# Patient Record
Sex: MALE | Race: WHITE | HISPANIC OR LATINO
[De-identification: names, ages, dates, MRNs, and addresses within clinical notes are randomized per-mention and may not be internally consistent; named-entity substitution may affect disease eponyms.]

---

## 2017-10-16 ENCOUNTER — RESULT REVIEW (OUTPATIENT)
Age: 72
End: 2017-10-16

## 2018-02-18 ENCOUNTER — TRANSCRIPTION ENCOUNTER (OUTPATIENT)
Age: 73
End: 2018-02-18

## 2018-04-11 ENCOUNTER — APPOINTMENT (OUTPATIENT)
Dept: UROLOGY | Facility: CLINIC | Age: 73
End: 2018-04-11
Payer: COMMERCIAL

## 2018-04-11 VITALS
DIASTOLIC BLOOD PRESSURE: 80 MMHG | RESPIRATION RATE: 16 BRPM | HEART RATE: 88 BPM | OXYGEN SATURATION: 97 % | SYSTOLIC BLOOD PRESSURE: 110 MMHG | WEIGHT: 175 LBS | TEMPERATURE: 98.3 F | BODY MASS INDEX: 28.12 KG/M2 | HEIGHT: 66 IN

## 2018-04-11 DIAGNOSIS — Z86.79 PERSONAL HISTORY OF OTHER DISEASES OF THE CIRCULATORY SYSTEM: ICD-10-CM

## 2018-04-11 DIAGNOSIS — Z86.39 PERSONAL HISTORY OF OTHER ENDOCRINE, NUTRITIONAL AND METABOLIC DISEASE: ICD-10-CM

## 2018-04-11 DIAGNOSIS — C43.9 MALIGNANT MELANOMA OF SKIN, UNSPECIFIED: ICD-10-CM

## 2018-04-11 DIAGNOSIS — Z84.1 FAMILY HISTORY OF DISORDERS OF KIDNEY AND URETER: ICD-10-CM

## 2018-04-11 PROCEDURE — 99214 OFFICE O/P EST MOD 30 MIN: CPT

## 2018-04-11 RX ORDER — SITAGLIPTIN 100 MG/1
TABLET, FILM COATED ORAL
Refills: 0 | Status: ACTIVE | COMMUNITY

## 2018-04-11 RX ORDER — GLIPIZIDE 2.5 MG/1
TABLET ORAL
Refills: 0 | Status: ACTIVE | COMMUNITY

## 2018-04-11 RX ORDER — DOXEPIN HYDROCHLORIDE 75 MG/1
CAPSULE ORAL
Refills: 0 | Status: ACTIVE | COMMUNITY

## 2018-04-11 RX ORDER — METFORMIN HYDROCHLORIDE 500 MG/1
500 TABLET, COATED ORAL
Refills: 0 | Status: ACTIVE | COMMUNITY

## 2018-04-11 RX ORDER — AMOXICILLIN AND CLAVULANATE POTASSIUM 875; 125 MG/1; MG/1
875-125 TABLET, COATED ORAL
Qty: 6 | Refills: 0 | Status: ACTIVE | COMMUNITY
Start: 2018-04-11 | End: 1900-01-01

## 2018-05-04 ENCOUNTER — APPOINTMENT (OUTPATIENT)
Dept: UROLOGY | Facility: CLINIC | Age: 73
End: 2018-05-04
Payer: COMMERCIAL

## 2018-05-04 VITALS
RESPIRATION RATE: 16 BRPM | HEART RATE: 88 BPM | OXYGEN SATURATION: 95 % | DIASTOLIC BLOOD PRESSURE: 92 MMHG | SYSTOLIC BLOOD PRESSURE: 120 MMHG

## 2018-05-04 PROCEDURE — 55700: CPT

## 2018-05-04 PROCEDURE — 76942 ECHO GUIDE FOR BIOPSY: CPT | Mod: 59

## 2018-05-04 PROCEDURE — 76872 US TRANSRECTAL: CPT

## 2018-05-16 ENCOUNTER — APPOINTMENT (OUTPATIENT)
Dept: UROLOGY | Facility: CLINIC | Age: 73
End: 2018-05-16
Payer: COMMERCIAL

## 2018-05-16 PROCEDURE — 99213 OFFICE O/P EST LOW 20 MIN: CPT

## 2018-05-17 LAB — CORE LAB BIOPSY: NORMAL

## 2018-10-30 ENCOUNTER — TRANSCRIPTION ENCOUNTER (OUTPATIENT)
Age: 73
End: 2018-10-30

## 2018-11-16 ENCOUNTER — APPOINTMENT (OUTPATIENT)
Dept: UROLOGY | Facility: CLINIC | Age: 73
End: 2018-11-16

## 2019-01-15 ENCOUNTER — TRANSCRIPTION ENCOUNTER (OUTPATIENT)
Age: 74
End: 2019-01-15

## 2019-07-04 ENCOUNTER — EMERGENCY (EMERGENCY)
Facility: HOSPITAL | Age: 74
LOS: 1 days | Discharge: HOME | End: 2019-07-04
Attending: STUDENT IN AN ORGANIZED HEALTH CARE EDUCATION/TRAINING PROGRAM
Payer: COMMERCIAL

## 2019-07-04 VITALS
SYSTOLIC BLOOD PRESSURE: 102 MMHG | WEIGHT: 175.05 LBS | TEMPERATURE: 98 F | OXYGEN SATURATION: 96 % | HEART RATE: 96 BPM | HEIGHT: 67 IN | DIASTOLIC BLOOD PRESSURE: 68 MMHG | RESPIRATION RATE: 18 BRPM

## 2019-07-04 PROCEDURE — 70450 CT HEAD/BRAIN W/O DYE: CPT | Mod: 26

## 2019-07-04 PROCEDURE — 99285 EMERGENCY DEPT VISIT HI MDM: CPT

## 2019-07-04 PROCEDURE — 70450 CT HEAD/BRAIN W/O DYE: CPT

## 2019-07-04 PROCEDURE — 99284 EMERGENCY DEPT VISIT MOD MDM: CPT | Mod: 25

## 2019-07-04 NOTE — ED ADULT NURSE NOTE - OBJECTIVE STATEMENT
Pt. c/o head injury s/p fall last week. Pt. tripped and fell forward hitting head on glass window of car. Pt. denies any dizziness, pain, LOC. sent by PCP for CT scan.

## 2019-07-04 NOTE — ED ADULT NURSE NOTE - NSIMPLEMENTINTERV_GEN_ALL_ED
Implemented All Fall Risk Interventions:  [place] to call system. Call bell, personal items and telephone within reach. Instruct patient to call for assistance. Room bathroom lighting operational. Non-slip footwear when patient is off stretcher. Physically safe environment: no spills, clutter or unnecessary equipment. Stretcher in lowest position, wheels locked, appropriate side rails in place. Provide visual cue, wrist band, yellow gown, etc. Monitor gait and stability. Monitor for mental status changes and reorient to person, place, and time. Review medications for side effects contributing to fall risk. Reinforce activity limits and safety measures with patient and family.

## 2019-07-04 NOTE — ED PROVIDER NOTE - CLINICAL SUMMARY MEDICAL DECISION MAKING FREE TEXT BOX
75 y/o M pt presents s/p head injury 1 week ago, neurovascularly intact. Given age, will obtain CT head to r/o intracranial injury and bleed.

## 2020-10-14 ENCOUNTER — APPOINTMENT (OUTPATIENT)
Dept: UROLOGY | Facility: CLINIC | Age: 75
End: 2020-10-14
Payer: COMMERCIAL

## 2020-10-14 DIAGNOSIS — C61 MALIGNANT NEOPLASM OF PROSTATE: ICD-10-CM

## 2020-10-14 PROCEDURE — 99213 OFFICE O/P EST LOW 20 MIN: CPT

## 2020-10-16 LAB — BACTERIA UR CULT: NORMAL

## 2020-10-28 ENCOUNTER — APPOINTMENT (OUTPATIENT)
Dept: UROLOGY | Facility: CLINIC | Age: 75
End: 2020-10-28

## 2020-10-28 PROBLEM — C61 ADENOCARCINOMA OF PROSTATE: Status: ACTIVE | Noted: 2018-04-11

## 2020-10-28 NOTE — HISTORY OF PRESENT ILLNESS
[FreeTextEntry1] : f/u cap\par s/p bx\par \par path jeronimo 6 in 3 cores 5/8\par doing well \par no fever \par voiding well\par psa has been rising \par 	12d ago\par 8mo ago\par 2yr ago\par 3yr ago\par 4yr ago\par PSA Ultrasensitive	0.00 - 4.00 ng/mL	16.70	13.80 CM	7.05 CM	4.53 CM	5.89

## 2022-10-17 NOTE — ED PROVIDER NOTE - NS ED SCRIBE STATEMENT
Attending O-L Flap Text: The defect edges were debeveled with a #15 scalpel blade.  Given the location of the defect, shape of the defect and the proximity to free margins an O-L flap was deemed most appropriate.  Using a sterile surgical marker, an appropriate advancement flap was drawn incorporating the defect and placing the expected incisions within the relaxed skin tension lines where possible.    The area thus outlined was incised deep to adipose tissue with a #15 scalpel blade.  The skin margins were undermined to an appropriate distance in all directions utilizing iris scissors.

## 2023-03-22 NOTE — PHYSICAL EXAM
· Left total knee arthroplasty in December 2022  · Suffered a mechanical fall in the bathtub  · Imaging revealed an acute displaced periprosthetic left distal femoral fracture    · Orthopedics following   · POD # 2 s/p left femoral intramedullary nail (retrograde for periprosthetic fracture)  · Lovenox 40 mg daily for 6 weeks  · Holding ASA while on Lovenox  · Weight bearing as tolerated   · SCDs  · Pain management  · Supportive care  · PT/OT recommending acute rehab [General Appearance - Well Developed] : well developed [General Appearance - Well Nourished] : well nourished [Normal Appearance] : normal appearance [Well Groomed] : well groomed [General Appearance - In No Acute Distress] : no acute distress [Abdomen Soft] : soft [Abdomen Tenderness] : non-tender [Costovertebral Angle Tenderness] : no ~M costovertebral angle tenderness [Urethral Meatus] : meatus normal [Urinary Bladder Findings] : the bladder was normal on palpation [Scrotum] : the scrotum was normal [Testes Mass (___cm)] : there were no testicular masses [No Prostate Nodules] : no prostate nodules [Edema] : no peripheral edema [] : no respiratory distress [Respiration, Rhythm And Depth] : normal respiratory rhythm and effort [Exaggerated Use Of Accessory Muscles For Inspiration] : no accessory muscle use [Oriented To Time, Place, And Person] : oriented to person, place, and time [Affect] : the affect was normal [Mood] : the mood was normal [Not Anxious] : not anxious [Normal Station and Gait] : the gait and station were normal for the patient's age [No Focal Deficits] : no focal deficits [No Palpable Adenopathy] : no palpable adenopathy

## 2023-05-15 ENCOUNTER — NON-APPOINTMENT (OUTPATIENT)
Age: 78
End: 2023-05-15

## 2023-10-24 ENCOUNTER — EMERGENCY (EMERGENCY)
Facility: HOSPITAL | Age: 78
LOS: 1 days | Discharge: HOME | End: 2023-10-24
Attending: STUDENT IN AN ORGANIZED HEALTH CARE EDUCATION/TRAINING PROGRAM
Payer: MEDICARE

## 2023-10-24 VITALS
DIASTOLIC BLOOD PRESSURE: 77 MMHG | OXYGEN SATURATION: 97 % | SYSTOLIC BLOOD PRESSURE: 114 MMHG | HEART RATE: 97 BPM | TEMPERATURE: 98 F | RESPIRATION RATE: 18 BRPM | WEIGHT: 141.1 LBS | HEIGHT: 66 IN

## 2023-10-24 VITALS
DIASTOLIC BLOOD PRESSURE: 71 MMHG | OXYGEN SATURATION: 95 % | HEART RATE: 71 BPM | SYSTOLIC BLOOD PRESSURE: 106 MMHG | TEMPERATURE: 98 F | RESPIRATION RATE: 18 BRPM

## 2023-10-24 LAB
ALBUMIN SERPL ELPH-MCNC: 4.3 G/DL — SIGNIFICANT CHANGE UP (ref 3.5–5)
ALBUMIN SERPL ELPH-MCNC: 4.3 G/DL — SIGNIFICANT CHANGE UP (ref 3.5–5)
ALP SERPL-CCNC: 85 U/L — SIGNIFICANT CHANGE UP (ref 40–120)
ALP SERPL-CCNC: 85 U/L — SIGNIFICANT CHANGE UP (ref 40–120)
ALT FLD-CCNC: 27 U/L DA — SIGNIFICANT CHANGE UP (ref 10–60)
ALT FLD-CCNC: 27 U/L DA — SIGNIFICANT CHANGE UP (ref 10–60)
ANION GAP SERPL CALC-SCNC: 5 MMOL/L — SIGNIFICANT CHANGE UP (ref 5–17)
ANION GAP SERPL CALC-SCNC: 5 MMOL/L — SIGNIFICANT CHANGE UP (ref 5–17)
APTT BLD: 29.3 SEC — SIGNIFICANT CHANGE UP (ref 24.5–35.6)
APTT BLD: 29.3 SEC — SIGNIFICANT CHANGE UP (ref 24.5–35.6)
AST SERPL-CCNC: 14 U/L — SIGNIFICANT CHANGE UP (ref 10–40)
AST SERPL-CCNC: 14 U/L — SIGNIFICANT CHANGE UP (ref 10–40)
BASOPHILS # BLD AUTO: 0.07 K/UL — SIGNIFICANT CHANGE UP (ref 0–0.2)
BASOPHILS # BLD AUTO: 0.07 K/UL — SIGNIFICANT CHANGE UP (ref 0–0.2)
BASOPHILS NFR BLD AUTO: 0.6 % — SIGNIFICANT CHANGE UP (ref 0–2)
BASOPHILS NFR BLD AUTO: 0.6 % — SIGNIFICANT CHANGE UP (ref 0–2)
BILIRUB SERPL-MCNC: 0.6 MG/DL — SIGNIFICANT CHANGE UP (ref 0.2–1.2)
BILIRUB SERPL-MCNC: 0.6 MG/DL — SIGNIFICANT CHANGE UP (ref 0.2–1.2)
BUN SERPL-MCNC: 21 MG/DL — HIGH (ref 7–18)
BUN SERPL-MCNC: 21 MG/DL — HIGH (ref 7–18)
CALCIUM SERPL-MCNC: 9.6 MG/DL — SIGNIFICANT CHANGE UP (ref 8.4–10.5)
CALCIUM SERPL-MCNC: 9.6 MG/DL — SIGNIFICANT CHANGE UP (ref 8.4–10.5)
CHLORIDE SERPL-SCNC: 103 MMOL/L — SIGNIFICANT CHANGE UP (ref 96–108)
CHLORIDE SERPL-SCNC: 103 MMOL/L — SIGNIFICANT CHANGE UP (ref 96–108)
CO2 SERPL-SCNC: 29 MMOL/L — SIGNIFICANT CHANGE UP (ref 22–31)
CO2 SERPL-SCNC: 29 MMOL/L — SIGNIFICANT CHANGE UP (ref 22–31)
CREAT SERPL-MCNC: 1.38 MG/DL — HIGH (ref 0.5–1.3)
CREAT SERPL-MCNC: 1.38 MG/DL — HIGH (ref 0.5–1.3)
EGFR: 52 ML/MIN/1.73M2 — LOW
EGFR: 52 ML/MIN/1.73M2 — LOW
EOSINOPHIL # BLD AUTO: 0.05 K/UL — SIGNIFICANT CHANGE UP (ref 0–0.5)
EOSINOPHIL # BLD AUTO: 0.05 K/UL — SIGNIFICANT CHANGE UP (ref 0–0.5)
EOSINOPHIL NFR BLD AUTO: 0.4 % — SIGNIFICANT CHANGE UP (ref 0–6)
EOSINOPHIL NFR BLD AUTO: 0.4 % — SIGNIFICANT CHANGE UP (ref 0–6)
GLUCOSE SERPL-MCNC: 275 MG/DL — HIGH (ref 70–99)
GLUCOSE SERPL-MCNC: 275 MG/DL — HIGH (ref 70–99)
HCT VFR BLD CALC: 42.8 % — SIGNIFICANT CHANGE UP (ref 39–50)
HCT VFR BLD CALC: 42.8 % — SIGNIFICANT CHANGE UP (ref 39–50)
HGB BLD-MCNC: 14.4 G/DL — SIGNIFICANT CHANGE UP (ref 13–17)
HGB BLD-MCNC: 14.4 G/DL — SIGNIFICANT CHANGE UP (ref 13–17)
IMM GRANULOCYTES NFR BLD AUTO: 0.4 % — SIGNIFICANT CHANGE UP (ref 0–0.9)
IMM GRANULOCYTES NFR BLD AUTO: 0.4 % — SIGNIFICANT CHANGE UP (ref 0–0.9)
INR BLD: 1.05 RATIO — SIGNIFICANT CHANGE UP (ref 0.85–1.18)
INR BLD: 1.05 RATIO — SIGNIFICANT CHANGE UP (ref 0.85–1.18)
LYMPHOCYTES # BLD AUTO: 0.97 K/UL — LOW (ref 1–3.3)
LYMPHOCYTES # BLD AUTO: 0.97 K/UL — LOW (ref 1–3.3)
LYMPHOCYTES # BLD AUTO: 8.1 % — LOW (ref 13–44)
LYMPHOCYTES # BLD AUTO: 8.1 % — LOW (ref 13–44)
MCHC RBC-ENTMCNC: 31.5 PG — SIGNIFICANT CHANGE UP (ref 27–34)
MCHC RBC-ENTMCNC: 31.5 PG — SIGNIFICANT CHANGE UP (ref 27–34)
MCHC RBC-ENTMCNC: 33.6 GM/DL — SIGNIFICANT CHANGE UP (ref 32–36)
MCHC RBC-ENTMCNC: 33.6 GM/DL — SIGNIFICANT CHANGE UP (ref 32–36)
MCV RBC AUTO: 93.7 FL — SIGNIFICANT CHANGE UP (ref 80–100)
MCV RBC AUTO: 93.7 FL — SIGNIFICANT CHANGE UP (ref 80–100)
MONOCYTES # BLD AUTO: 0.72 K/UL — SIGNIFICANT CHANGE UP (ref 0–0.9)
MONOCYTES # BLD AUTO: 0.72 K/UL — SIGNIFICANT CHANGE UP (ref 0–0.9)
MONOCYTES NFR BLD AUTO: 6 % — SIGNIFICANT CHANGE UP (ref 2–14)
MONOCYTES NFR BLD AUTO: 6 % — SIGNIFICANT CHANGE UP (ref 2–14)
NEUTROPHILS # BLD AUTO: 10.15 K/UL — HIGH (ref 1.8–7.4)
NEUTROPHILS # BLD AUTO: 10.15 K/UL — HIGH (ref 1.8–7.4)
NEUTROPHILS NFR BLD AUTO: 84.5 % — HIGH (ref 43–77)
NEUTROPHILS NFR BLD AUTO: 84.5 % — HIGH (ref 43–77)
NRBC # BLD: 0 /100 WBCS — SIGNIFICANT CHANGE UP (ref 0–0)
NRBC # BLD: 0 /100 WBCS — SIGNIFICANT CHANGE UP (ref 0–0)
NT-PROBNP SERPL-SCNC: 203 PG/ML — SIGNIFICANT CHANGE UP (ref 0–450)
NT-PROBNP SERPL-SCNC: 203 PG/ML — SIGNIFICANT CHANGE UP (ref 0–450)
PLATELET # BLD AUTO: 257 K/UL — SIGNIFICANT CHANGE UP (ref 150–400)
PLATELET # BLD AUTO: 257 K/UL — SIGNIFICANT CHANGE UP (ref 150–400)
POTASSIUM SERPL-MCNC: 4 MMOL/L — SIGNIFICANT CHANGE UP (ref 3.5–5.3)
POTASSIUM SERPL-MCNC: 4 MMOL/L — SIGNIFICANT CHANGE UP (ref 3.5–5.3)
POTASSIUM SERPL-SCNC: 4 MMOL/L — SIGNIFICANT CHANGE UP (ref 3.5–5.3)
POTASSIUM SERPL-SCNC: 4 MMOL/L — SIGNIFICANT CHANGE UP (ref 3.5–5.3)
PROT SERPL-MCNC: 7.9 G/DL — SIGNIFICANT CHANGE UP (ref 6–8.3)
PROT SERPL-MCNC: 7.9 G/DL — SIGNIFICANT CHANGE UP (ref 6–8.3)
PROTHROM AB SERPL-ACNC: 11.9 SEC — SIGNIFICANT CHANGE UP (ref 9.5–13)
PROTHROM AB SERPL-ACNC: 11.9 SEC — SIGNIFICANT CHANGE UP (ref 9.5–13)
RBC # BLD: 4.57 M/UL — SIGNIFICANT CHANGE UP (ref 4.2–5.8)
RBC # BLD: 4.57 M/UL — SIGNIFICANT CHANGE UP (ref 4.2–5.8)
RBC # FLD: 12 % — SIGNIFICANT CHANGE UP (ref 10.3–14.5)
RBC # FLD: 12 % — SIGNIFICANT CHANGE UP (ref 10.3–14.5)
SODIUM SERPL-SCNC: 137 MMOL/L — SIGNIFICANT CHANGE UP (ref 135–145)
SODIUM SERPL-SCNC: 137 MMOL/L — SIGNIFICANT CHANGE UP (ref 135–145)
TROPONIN I, HIGH SENSITIVITY RESULT: 9.9 NG/L — SIGNIFICANT CHANGE UP
TROPONIN I, HIGH SENSITIVITY RESULT: 9.9 NG/L — SIGNIFICANT CHANGE UP
WBC # BLD: 12.01 K/UL — HIGH (ref 3.8–10.5)
WBC # BLD: 12.01 K/UL — HIGH (ref 3.8–10.5)
WBC # FLD AUTO: 12.01 K/UL — HIGH (ref 3.8–10.5)
WBC # FLD AUTO: 12.01 K/UL — HIGH (ref 3.8–10.5)

## 2023-10-24 PROCEDURE — 80053 COMPREHEN METABOLIC PANEL: CPT

## 2023-10-24 PROCEDURE — 99285 EMERGENCY DEPT VISIT HI MDM: CPT | Mod: 25

## 2023-10-24 PROCEDURE — 82962 GLUCOSE BLOOD TEST: CPT

## 2023-10-24 PROCEDURE — 71045 X-RAY EXAM CHEST 1 VIEW: CPT | Mod: 26

## 2023-10-24 PROCEDURE — 36415 COLL VENOUS BLD VENIPUNCTURE: CPT

## 2023-10-24 PROCEDURE — 99285 EMERGENCY DEPT VISIT HI MDM: CPT

## 2023-10-24 PROCEDURE — 93005 ELECTROCARDIOGRAM TRACING: CPT

## 2023-10-24 PROCEDURE — 84484 ASSAY OF TROPONIN QUANT: CPT

## 2023-10-24 PROCEDURE — 71045 X-RAY EXAM CHEST 1 VIEW: CPT

## 2023-10-24 PROCEDURE — 85730 THROMBOPLASTIN TIME PARTIAL: CPT

## 2023-10-24 PROCEDURE — 70450 CT HEAD/BRAIN W/O DYE: CPT | Mod: 26,MA

## 2023-10-24 PROCEDURE — 70450 CT HEAD/BRAIN W/O DYE: CPT | Mod: MA

## 2023-10-24 PROCEDURE — 83880 ASSAY OF NATRIURETIC PEPTIDE: CPT

## 2023-10-24 PROCEDURE — 85025 COMPLETE CBC W/AUTO DIFF WBC: CPT

## 2023-10-24 PROCEDURE — 85610 PROTHROMBIN TIME: CPT

## 2023-10-24 NOTE — ED PROVIDER NOTE - PROGRESS NOTE DETAILS
No Patient lab wnl. ct negative. endorses feeling improved. offered admission for syncope work up and monitoring. patient declined. state he feels well and does not want to stay. given return precaution and instructed to fu pmd

## 2023-10-24 NOTE — ED ADULT TRIAGE NOTE - CHIEF COMPLAINT QUOTE
s/p syncopal episode while walking in the street this morning with wife , c/o dizzy , weak pale clammy

## 2023-10-24 NOTE — ED ADULT NURSE NOTE - NSFALLHARMRISKINTERV_ED_ALL_ED

## 2023-10-24 NOTE — ED PROVIDER NOTE - NSFOLLOWUPCLINICS_GEN_ALL_ED_FT
[facility]  Cardiology  [address], [address]  [place] [address]  Phone: [phone]  Fax:

## 2023-10-24 NOTE — ED PROVIDER NOTE - PATIENT PORTAL LINK FT
You can access the FollowMyHealth Patient Portal offered by [facility] by registering at the following website: [email or web address]. By joining [employer]’s FollowMyHealth portal, you will also be able to view your health information using other applications (apps) compatible with our system.

## 2023-10-24 NOTE — ED ADULT NURSE NOTE - OBJECTIVE STATEMENT
axox3 ,nad ,syncopal episode on the street - pt was sitting on the bench - slip from the bench , NO FALL , wife at bed side

## 2023-10-24 NOTE — ED PROVIDER NOTE - CLINICAL SUMMARY MEDICAL DECISION MAKING FREE TEXT BOX
Patient presenting s.p syncope. neuro intact. no head trauma. will obtain lab, cxr, assess for acs, ed obs and reassess

## 2023-10-24 NOTE — ED PROVIDER NOTE - OBJECTIVE STATEMENT
78 y.o w/ pmh of dm, htn, hld presenting with syncope today. per family, syncope occurred for several minutes. no head trauma. patient denies cp, sob, n, v, abd pain, headache

## 2025-07-12 ENCOUNTER — INPATIENT (INPATIENT)
Facility: HOSPITAL | Age: 80
LOS: 4 days | Discharge: NURSING, REHAB OR LONG-TERM CARE | DRG: 872 | End: 2025-07-17
Attending: INTERNAL MEDICINE | Admitting: INTERNAL MEDICINE
Payer: MEDICARE

## 2025-07-12 VITALS
OXYGEN SATURATION: 95 % | DIASTOLIC BLOOD PRESSURE: 82 MMHG | HEART RATE: 133 BPM | RESPIRATION RATE: 16 BRPM | SYSTOLIC BLOOD PRESSURE: 116 MMHG | TEMPERATURE: 98 F | WEIGHT: 149.91 LBS | HEIGHT: 66 IN

## 2025-07-12 DIAGNOSIS — A41.9 SEPSIS, UNSPECIFIED ORGANISM: ICD-10-CM

## 2025-07-12 DIAGNOSIS — R79.89 OTHER SPECIFIED ABNORMAL FINDINGS OF BLOOD CHEMISTRY: ICD-10-CM

## 2025-07-12 DIAGNOSIS — E78.5 HYPERLIPIDEMIA, UNSPECIFIED: ICD-10-CM

## 2025-07-12 DIAGNOSIS — R65.10 SYSTEMIC INFLAMMATORY RESPONSE SYNDROME (SIRS) OF NON-INFECTIOUS ORIGIN WITHOUT ACUTE ORGAN DYSFUNCTION: ICD-10-CM

## 2025-07-12 DIAGNOSIS — E11.9 TYPE 2 DIABETES MELLITUS WITHOUT COMPLICATIONS: ICD-10-CM

## 2025-07-12 DIAGNOSIS — E11.65 TYPE 2 DIABETES MELLITUS WITH HYPERGLYCEMIA: ICD-10-CM

## 2025-07-12 DIAGNOSIS — N17.9 ACUTE KIDNEY FAILURE, UNSPECIFIED: ICD-10-CM

## 2025-07-12 DIAGNOSIS — F03.90 UNSPECIFIED DEMENTIA, UNSPECIFIED SEVERITY, WITHOUT BEHAVIORAL DISTURBANCE, PSYCHOTIC DISTURBANCE, MOOD DISTURBANCE, AND ANXIETY: ICD-10-CM

## 2025-07-12 DIAGNOSIS — R55 SYNCOPE AND COLLAPSE: ICD-10-CM

## 2025-07-12 DIAGNOSIS — I10 ESSENTIAL (PRIMARY) HYPERTENSION: ICD-10-CM

## 2025-07-12 DIAGNOSIS — R33.9 RETENTION OF URINE, UNSPECIFIED: ICD-10-CM

## 2025-07-12 DIAGNOSIS — Z29.9 ENCOUNTER FOR PROPHYLACTIC MEASURES, UNSPECIFIED: ICD-10-CM

## 2025-07-12 LAB
ACETONE SERPL-MCNC: ABNORMAL
ALBUMIN SERPL ELPH-MCNC: 4.1 G/DL — SIGNIFICANT CHANGE UP (ref 3.5–5)
ALP SERPL-CCNC: 84 U/L — SIGNIFICANT CHANGE UP (ref 40–120)
ALT FLD-CCNC: 23 U/L DA — SIGNIFICANT CHANGE UP (ref 10–60)
ANION GAP SERPL CALC-SCNC: 11 MMOL/L — SIGNIFICANT CHANGE UP (ref 5–17)
ANION GAP SERPL CALC-SCNC: 12 MMOL/L — SIGNIFICANT CHANGE UP (ref 5–17)
ANION GAP SERPL CALC-SCNC: 6 MMOL/L — SIGNIFICANT CHANGE UP (ref 5–17)
APPEARANCE UR: CLEAR — SIGNIFICANT CHANGE UP
APTT BLD: 27.5 SEC — SIGNIFICANT CHANGE UP (ref 26.1–36.8)
AST SERPL-CCNC: 19 U/L — SIGNIFICANT CHANGE UP (ref 10–40)
BACTERIA # UR AUTO: ABNORMAL /HPF
BASE EXCESS BLDV CALC-SCNC: -3.1 MMOL/L — SIGNIFICANT CHANGE UP
BASE EXCESS BLDV CALC-SCNC: -4 MMOL/L — SIGNIFICANT CHANGE UP
BASOPHILS # BLD AUTO: 0.04 K/UL — SIGNIFICANT CHANGE UP (ref 0–0.2)
BASOPHILS NFR BLD AUTO: 0.2 % — SIGNIFICANT CHANGE UP (ref 0–2)
BILIRUB SERPL-MCNC: 0.7 MG/DL — SIGNIFICANT CHANGE UP (ref 0.2–1.2)
BILIRUB UR-MCNC: NEGATIVE — SIGNIFICANT CHANGE UP
BUN SERPL-MCNC: 17 MG/DL — SIGNIFICANT CHANGE UP (ref 7–18)
BUN SERPL-MCNC: 17 MG/DL — SIGNIFICANT CHANGE UP (ref 7–18)
BUN SERPL-MCNC: 20 MG/DL — HIGH (ref 7–18)
CA-I SERPL-SCNC: SIGNIFICANT CHANGE UP MMOL/L (ref 1.15–1.33)
CALCIUM SERPL-MCNC: 8.9 MG/DL — SIGNIFICANT CHANGE UP (ref 8.4–10.5)
CALCIUM SERPL-MCNC: 9.3 MG/DL — SIGNIFICANT CHANGE UP (ref 8.4–10.5)
CALCIUM SERPL-MCNC: 9.7 MG/DL — SIGNIFICANT CHANGE UP (ref 8.4–10.5)
CHLORIDE SERPL-SCNC: 103 MMOL/L — SIGNIFICANT CHANGE UP (ref 96–108)
CHLORIDE SERPL-SCNC: 105 MMOL/L — SIGNIFICANT CHANGE UP (ref 96–108)
CHLORIDE SERPL-SCNC: 107 MMOL/L — SIGNIFICANT CHANGE UP (ref 96–108)
CO2 SERPL-SCNC: 23 MMOL/L — SIGNIFICANT CHANGE UP (ref 22–31)
CO2 SERPL-SCNC: 23 MMOL/L — SIGNIFICANT CHANGE UP (ref 22–31)
CO2 SERPL-SCNC: 27 MMOL/L — SIGNIFICANT CHANGE UP (ref 22–31)
COLOR SPEC: YELLOW — SIGNIFICANT CHANGE UP
COMMENT - URINE: SIGNIFICANT CHANGE UP
CREAT SERPL-MCNC: 1.36 MG/DL — HIGH (ref 0.5–1.3)
CREAT SERPL-MCNC: 1.37 MG/DL — HIGH (ref 0.5–1.3)
CREAT SERPL-MCNC: 1.67 MG/DL — HIGH (ref 0.5–1.3)
DIFF PNL FLD: NEGATIVE — SIGNIFICANT CHANGE UP
EGFR: 41 ML/MIN/1.73M2 — LOW
EGFR: 41 ML/MIN/1.73M2 — LOW
EGFR: 52 ML/MIN/1.73M2 — LOW
EOSINOPHIL # BLD AUTO: 0.01 K/UL — SIGNIFICANT CHANGE UP (ref 0–0.5)
EOSINOPHIL NFR BLD AUTO: 0.1 % — SIGNIFICANT CHANGE UP (ref 0–6)
EPI CELLS # UR: SIGNIFICANT CHANGE UP
FLUAV AG NPH QL: SIGNIFICANT CHANGE UP
FLUBV AG NPH QL: SIGNIFICANT CHANGE UP
GAS PNL BLDV: 135 MMOL/L — LOW (ref 136–145)
GAS PNL BLDV: SIGNIFICANT CHANGE UP
GLUCOSE BLDC GLUCOMTR-MCNC: 282 MG/DL — HIGH (ref 70–99)
GLUCOSE BLDV-MCNC: 419 MG/DL — HIGH (ref 70–99)
GLUCOSE SERPL-MCNC: 312 MG/DL — HIGH (ref 70–99)
GLUCOSE SERPL-MCNC: 393 MG/DL — HIGH (ref 70–99)
GLUCOSE SERPL-MCNC: 423 MG/DL — HIGH (ref 70–99)
GLUCOSE UR QL: >=1000 MG/DL
HCO3 BLDV-SCNC: 23 MMOL/L — SIGNIFICANT CHANGE UP (ref 22–29)
HCO3 BLDV-SCNC: 24 MMOL/L — SIGNIFICANT CHANGE UP (ref 22–29)
HCT VFR BLD CALC: 46.7 % — SIGNIFICANT CHANGE UP (ref 39–50)
HGB BLD-MCNC: 16.2 G/DL — SIGNIFICANT CHANGE UP (ref 13–17)
IMM GRANULOCYTES NFR BLD AUTO: 0.5 % — SIGNIFICANT CHANGE UP (ref 0–0.9)
INR BLD: 1.07 RATIO — SIGNIFICANT CHANGE UP (ref 0.85–1.16)
KETONES UR QL: >=160 MG/DL
LACTATE BLDV-MCNC: 4.1 MMOL/L — CRITICAL HIGH (ref 0.5–2)
LACTATE SERPL-SCNC: 2.8 MMOL/L — HIGH (ref 0.7–2)
LACTATE SERPL-SCNC: 3.3 MMOL/L — HIGH (ref 0.7–2)
LACTATE SERPL-SCNC: 3.6 MMOL/L — HIGH (ref 0.7–2)
LEUKOCYTE ESTERASE UR-ACNC: NEGATIVE — SIGNIFICANT CHANGE UP
LYMPHOCYTES # BLD AUTO: 0.98 K/UL — LOW (ref 1–3.3)
LYMPHOCYTES # BLD AUTO: 5.1 % — LOW (ref 13–44)
MAGNESIUM SERPL-MCNC: 1.7 MG/DL — SIGNIFICANT CHANGE UP (ref 1.6–2.6)
MCHC RBC-ENTMCNC: 31.8 PG — SIGNIFICANT CHANGE UP (ref 27–34)
MCHC RBC-ENTMCNC: 34.7 G/DL — SIGNIFICANT CHANGE UP (ref 32–36)
MCV RBC AUTO: 91.7 FL — SIGNIFICANT CHANGE UP (ref 80–100)
MONOCYTES # BLD AUTO: 1.21 K/UL — HIGH (ref 0–0.9)
MONOCYTES NFR BLD AUTO: 6.3 % — SIGNIFICANT CHANGE UP (ref 2–14)
NEUTROPHILS # BLD AUTO: 16.93 K/UL — HIGH (ref 1.8–7.4)
NEUTROPHILS NFR BLD AUTO: 87.8 % — HIGH (ref 43–77)
NITRITE UR-MCNC: NEGATIVE — SIGNIFICANT CHANGE UP
NRBC BLD AUTO-RTO: 0 /100 WBCS — SIGNIFICANT CHANGE UP (ref 0–0)
PCO2 BLDV: 46 MMHG — SIGNIFICANT CHANGE UP (ref 42–55)
PCO2 BLDV: 50 MMHG — SIGNIFICANT CHANGE UP (ref 42–55)
PH BLDV: 7.29 — LOW (ref 7.32–7.43)
PH BLDV: 7.3 — LOW (ref 7.32–7.43)
PH UR: 5 — SIGNIFICANT CHANGE UP (ref 5–8)
PLATELET # BLD AUTO: 275 K/UL — SIGNIFICANT CHANGE UP (ref 150–400)
PO2 BLDV: 21 MMHG — SIGNIFICANT CHANGE UP
PO2 BLDV: 25 MMHG — SIGNIFICANT CHANGE UP
POTASSIUM BLDV-SCNC: 4.5 MMOL/L — SIGNIFICANT CHANGE UP (ref 3.5–5.1)
POTASSIUM SERPL-MCNC: 4.1 MMOL/L — SIGNIFICANT CHANGE UP (ref 3.5–5.3)
POTASSIUM SERPL-MCNC: 4.3 MMOL/L — SIGNIFICANT CHANGE UP (ref 3.5–5.3)
POTASSIUM SERPL-MCNC: 4.4 MMOL/L — SIGNIFICANT CHANGE UP (ref 3.5–5.3)
POTASSIUM SERPL-SCNC: 4.1 MMOL/L — SIGNIFICANT CHANGE UP (ref 3.5–5.3)
POTASSIUM SERPL-SCNC: 4.3 MMOL/L — SIGNIFICANT CHANGE UP (ref 3.5–5.3)
POTASSIUM SERPL-SCNC: 4.4 MMOL/L — SIGNIFICANT CHANGE UP (ref 3.5–5.3)
PROT SERPL-MCNC: 7.2 G/DL — SIGNIFICANT CHANGE UP (ref 6–8.3)
PROT UR-MCNC: ABNORMAL MG/DL
PROTHROM AB SERPL-ACNC: 12.5 SEC — SIGNIFICANT CHANGE UP (ref 9.9–13.4)
RBC # BLD: 5.09 M/UL — SIGNIFICANT CHANGE UP (ref 4.2–5.8)
RBC # FLD: 12 % — SIGNIFICANT CHANGE UP (ref 10.3–14.5)
RBC CASTS # UR COMP ASSIST: 2 /HPF — SIGNIFICANT CHANGE UP (ref 0–4)
RSV RNA NPH QL NAA+NON-PROBE: SIGNIFICANT CHANGE UP
SAO2 % BLDV: 28.9 % — SIGNIFICANT CHANGE UP
SAO2 % BLDV: 35.9 % — SIGNIFICANT CHANGE UP
SARS-COV-2 RNA SPEC QL NAA+PROBE: SIGNIFICANT CHANGE UP
SODIUM SERPL-SCNC: 138 MMOL/L — SIGNIFICANT CHANGE UP (ref 135–145)
SODIUM SERPL-SCNC: 139 MMOL/L — SIGNIFICANT CHANGE UP (ref 135–145)
SODIUM SERPL-SCNC: 140 MMOL/L — SIGNIFICANT CHANGE UP (ref 135–145)
SOURCE RESPIRATORY: SIGNIFICANT CHANGE UP
SP GR SPEC: 1.03 — HIGH (ref 1–1.03)
TROPONIN I, HIGH SENSITIVITY RESULT: 16.6 NG/L — SIGNIFICANT CHANGE UP
UROBILINOGEN FLD QL: 0.2 MG/DL — SIGNIFICANT CHANGE UP (ref 0.2–1)
WBC # BLD: 19.26 K/UL — HIGH (ref 3.8–10.5)
WBC # FLD AUTO: 19.26 K/UL — HIGH (ref 3.8–10.5)
WBC UR QL: 2 /HPF — SIGNIFICANT CHANGE UP (ref 0–5)

## 2025-07-12 PROCEDURE — 85025 COMPLETE CBC W/AUTO DIFF WBC: CPT

## 2025-07-12 PROCEDURE — 72125 CT NECK SPINE W/O DYE: CPT | Mod: 26

## 2025-07-12 PROCEDURE — 70450 CT HEAD/BRAIN W/O DYE: CPT | Mod: 26

## 2025-07-12 PROCEDURE — 80048 BASIC METABOLIC PNL TOTAL CA: CPT

## 2025-07-12 PROCEDURE — 82962 GLUCOSE BLOOD TEST: CPT

## 2025-07-12 PROCEDURE — 87040 BLOOD CULTURE FOR BACTERIA: CPT

## 2025-07-12 PROCEDURE — 84295 ASSAY OF SERUM SODIUM: CPT

## 2025-07-12 PROCEDURE — 80053 COMPREHEN METABOLIC PANEL: CPT

## 2025-07-12 PROCEDURE — 72170 X-RAY EXAM OF PELVIS: CPT | Mod: 26

## 2025-07-12 PROCEDURE — 71045 X-RAY EXAM CHEST 1 VIEW: CPT | Mod: 26

## 2025-07-12 PROCEDURE — 83605 ASSAY OF LACTIC ACID: CPT

## 2025-07-12 PROCEDURE — 72125 CT NECK SPINE W/O DYE: CPT

## 2025-07-12 PROCEDURE — 84132 ASSAY OF SERUM POTASSIUM: CPT

## 2025-07-12 PROCEDURE — 85610 PROTHROMBIN TIME: CPT

## 2025-07-12 PROCEDURE — 70450 CT HEAD/BRAIN W/O DYE: CPT

## 2025-07-12 PROCEDURE — 81001 URINALYSIS AUTO W/SCOPE: CPT

## 2025-07-12 PROCEDURE — 72170 X-RAY EXAM OF PELVIS: CPT

## 2025-07-12 PROCEDURE — 83735 ASSAY OF MAGNESIUM: CPT

## 2025-07-12 PROCEDURE — 85730 THROMBOPLASTIN TIME PARTIAL: CPT

## 2025-07-12 PROCEDURE — 82330 ASSAY OF CALCIUM: CPT

## 2025-07-12 PROCEDURE — 82803 BLOOD GASES ANY COMBINATION: CPT

## 2025-07-12 PROCEDURE — 71045 X-RAY EXAM CHEST 1 VIEW: CPT

## 2025-07-12 PROCEDURE — 87637 SARSCOV2&INF A&B&RSV AMP PRB: CPT

## 2025-07-12 PROCEDURE — 82947 ASSAY GLUCOSE BLOOD QUANT: CPT

## 2025-07-12 PROCEDURE — 84484 ASSAY OF TROPONIN QUANT: CPT

## 2025-07-12 PROCEDURE — 82009 KETONE BODYS QUAL: CPT

## 2025-07-12 PROCEDURE — 93010 ELECTROCARDIOGRAM REPORT: CPT

## 2025-07-12 PROCEDURE — 99285 EMERGENCY DEPT VISIT HI MDM: CPT

## 2025-07-12 PROCEDURE — 36415 COLL VENOUS BLD VENIPUNCTURE: CPT

## 2025-07-12 RX ORDER — SODIUM CHLORIDE 9 G/1000ML
1000 INJECTION, SOLUTION INTRAVENOUS
Refills: 0 | Status: DISCONTINUED | OUTPATIENT
Start: 2025-07-12 | End: 2025-07-17

## 2025-07-12 RX ORDER — HEPARIN SODIUM 1000 [USP'U]/ML
5000 INJECTION INTRAVENOUS; SUBCUTANEOUS EVERY 12 HOURS
Refills: 0 | Status: DISCONTINUED | OUTPATIENT
Start: 2025-07-12 | End: 2025-07-17

## 2025-07-12 RX ORDER — INSULIN LISPRO 100 U/ML
4 INJECTION, SOLUTION INTRAVENOUS; SUBCUTANEOUS
Refills: 0 | Status: DISCONTINUED | OUTPATIENT
Start: 2025-07-12 | End: 2025-07-15

## 2025-07-12 RX ORDER — MELATONIN 5 MG
3 TABLET ORAL AT BEDTIME
Refills: 0 | Status: DISCONTINUED | OUTPATIENT
Start: 2025-07-12 | End: 2025-07-13

## 2025-07-12 RX ORDER — SODIUM CHLORIDE 9 G/1000ML
2000 INJECTION, SOLUTION INTRAVENOUS ONCE
Refills: 0 | Status: COMPLETED | OUTPATIENT
Start: 2025-07-12 | End: 2025-07-12

## 2025-07-12 RX ORDER — CEFTRIAXONE 500 MG/1
1000 INJECTION, POWDER, FOR SOLUTION INTRAMUSCULAR; INTRAVENOUS ONCE
Refills: 0 | Status: COMPLETED | OUTPATIENT
Start: 2025-07-12 | End: 2025-07-12

## 2025-07-12 RX ORDER — DEXTROSE 50 % IN WATER 50 %
12.5 SYRINGE (ML) INTRAVENOUS ONCE
Refills: 0 | Status: DISCONTINUED | OUTPATIENT
Start: 2025-07-12 | End: 2025-07-17

## 2025-07-12 RX ORDER — ACETAMINOPHEN 500 MG/5ML
650 LIQUID (ML) ORAL EVERY 6 HOURS
Refills: 0 | Status: DISCONTINUED | OUTPATIENT
Start: 2025-07-12 | End: 2025-07-17

## 2025-07-12 RX ORDER — SITAGLIPTIN 100 MG/1
1 TABLET, FILM COATED ORAL
Refills: 0 | DISCHARGE

## 2025-07-12 RX ORDER — SODIUM CHLORIDE 9 G/1000ML
1000 INJECTION, SOLUTION INTRAVENOUS ONCE
Refills: 0 | Status: COMPLETED | OUTPATIENT
Start: 2025-07-12 | End: 2025-07-12

## 2025-07-12 RX ORDER — VANCOMYCIN HCL IN 5 % DEXTROSE 1.5G/250ML
1000 PLASTIC BAG, INJECTION (ML) INTRAVENOUS ONCE
Refills: 0 | Status: COMPLETED | OUTPATIENT
Start: 2025-07-12 | End: 2025-07-12

## 2025-07-12 RX ORDER — GLUCAGON 3 MG/1
1 POWDER NASAL ONCE
Refills: 0 | Status: DISCONTINUED | OUTPATIENT
Start: 2025-07-12 | End: 2025-07-17

## 2025-07-12 RX ORDER — DEXTROSE 50 % IN WATER 50 %
25 SYRINGE (ML) INTRAVENOUS ONCE
Refills: 0 | Status: DISCONTINUED | OUTPATIENT
Start: 2025-07-12 | End: 2025-07-17

## 2025-07-12 RX ORDER — INSULIN LISPRO 100 U/ML
10 INJECTION, SOLUTION INTRAVENOUS; SUBCUTANEOUS ONCE
Refills: 0 | Status: COMPLETED | OUTPATIENT
Start: 2025-07-12 | End: 2025-07-12

## 2025-07-12 RX ORDER — MAGNESIUM, ALUMINUM HYDROXIDE 200-200 MG
30 TABLET,CHEWABLE ORAL EVERY 4 HOURS
Refills: 0 | Status: DISCONTINUED | OUTPATIENT
Start: 2025-07-12 | End: 2025-07-17

## 2025-07-12 RX ORDER — DEXTROSE 50 % IN WATER 50 %
15 SYRINGE (ML) INTRAVENOUS ONCE
Refills: 0 | Status: DISCONTINUED | OUTPATIENT
Start: 2025-07-12 | End: 2025-07-17

## 2025-07-12 RX ORDER — INSULIN LISPRO 100 U/ML
INJECTION, SOLUTION INTRAVENOUS; SUBCUTANEOUS
Refills: 0 | Status: DISCONTINUED | OUTPATIENT
Start: 2025-07-12 | End: 2025-07-17

## 2025-07-12 RX ORDER — ONDANSETRON HCL/PF 4 MG/2 ML
4 VIAL (ML) INJECTION EVERY 8 HOURS
Refills: 0 | Status: DISCONTINUED | OUTPATIENT
Start: 2025-07-12 | End: 2025-07-17

## 2025-07-12 RX ORDER — INSULIN GLARGINE-YFGN 100 [IU]/ML
13 INJECTION, SOLUTION SUBCUTANEOUS AT BEDTIME
Refills: 0 | Status: DISCONTINUED | OUTPATIENT
Start: 2025-07-12 | End: 2025-07-17

## 2025-07-12 RX ADMIN — SODIUM CHLORIDE 1000 MILLILITER(S): 9 INJECTION, SOLUTION INTRAVENOUS at 12:24

## 2025-07-12 RX ADMIN — SODIUM CHLORIDE 1000 MILLILITER(S): 9 INJECTION, SOLUTION INTRAVENOUS at 15:44

## 2025-07-12 RX ADMIN — INSULIN GLARGINE-YFGN 13 UNIT(S): 100 INJECTION, SOLUTION SUBCUTANEOUS at 22:45

## 2025-07-12 RX ADMIN — SODIUM CHLORIDE 2000 MILLILITER(S): 9 INJECTION, SOLUTION INTRAVENOUS at 19:31

## 2025-07-12 RX ADMIN — CEFTRIAXONE 100 MILLIGRAM(S): 500 INJECTION, POWDER, FOR SOLUTION INTRAMUSCULAR; INTRAVENOUS at 13:36

## 2025-07-12 RX ADMIN — INSULIN LISPRO 10 UNIT(S): 100 INJECTION, SOLUTION INTRAVENOUS; SUBCUTANEOUS at 19:45

## 2025-07-12 NOTE — ED ADULT NURSE NOTE - OBJECTIVE STATEMENT
Medication:    Outpatient Medications Marked as Taking for the 4/8/19 encounter (Refill) with Mira Valentin MD   Medication Sig Dispense Refill   • amphetamine-dextroamphetamine XR (ADDERALL XR) 15 MG 24 hr capsule Take 1 capsule by mouth daily. 30 capsule 0       Message to Prescriber: Patient states the insurance requires a \"MD note\" before they will cover the second refill.     Pharmacy has been verified.    [] Patient completely out of medication     Patient currently has follow up appointment scheduled      
Refill for Amphetamine Dextroamphetamine XR Adderall Xr 15 mg capsule taking 1 daily, verified in EMR/MD note of  2/15/19    Pt last seen 2/15/19  Next appt. 5/15/19  Last prescribed  2/15/19 2 months provided.  Last filled per PDMP on 2/15/19.   Per [facility] Pharmacist second script filled/sold 4/11/19.  
Patient alert and oriented x2-3, s/p fall from toilet with + head strike to left face with redness and pain. No bleeding noted at this time. Denies dizziness, blurry vision, chest pain/SOB, LOC, anticoagulant use.     Fall and safety precautions maintained, patient placed on bed alarm, yellow gown, red socks.

## 2025-07-12 NOTE — PATIENT PROFILE ADULT - FUNCTIONAL ASSESSMENT - DAILY ACTIVITY 2.
[FreeTextEntry1] : 82 yo w with some comorbidities, including RA, with stage IV diagnosed lung adeno ca\par Baseline PET/CT showed FDG avid mass in the left lower lung likely corresponding to the newly diagnosed adenocarcinoma. FDG avid nodule in the lingula, satellite lesion versus metastasis. FDG avid nodular opacities in the right middle and right lower lobes; differential includes infectious and metastatic disease. FDG avid pleural thickening in the left lower lung, suspicious for metastasis. Multiple FDG avid mediastinal, and bilateral hilar lymph nodes compatible with metastases. FDG avid bilateral supraclavicular lymph nodes suspicious for additional metastatic lesions. The left supraclavicular lymph node is amenable to ultrasound-guided FNA biopsy as indicated.  Scattered mildly FDG avid osseous foci in the axial skeleton and right proximal femur, concerning for metastases.  FDG avid low-attenuation lesion within the enlarged left thyroid lobe. Differential includes benign and malignant etiologies. This may be further evaluated with ultrasound and possible FNA biopsy. Nonspecific diffuse gastric hypermetabolism without CT correlate. Please correlate clinically for gastritis. 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 Healed bony mets were noted. \par Brain MRI showed no mets\par PET-CT done Febuary 2023 showed decreased size and metabolism if the left lung mass, and reduced/resolved supraclavicular, mediastinal, lingular, perihilar lymph nodes, and increasing sclerosis of osseous lesions. \par \par Continue Tagrisso 80mg daily \par Continue Xgeva for bone mets \par Pleural effusion continue management as per Dr. Dos Santos \par Discussed administration, potential AEs, importance of close monitoring with labs, toxicity and efficacy assessment, and EKG while on this high risk medication\par LE swelling likely sec to multifactorial- including perhaps prednisone. This is being managed by PCP. \par OV in 4 weeks. \par \par \par \par 
2 = A lot of assistance

## 2025-07-12 NOTE — H&P ADULT - PROBLEM SELECTOR PLAN 1
Pt poor historian hx of dementia AAO2-3 at baseline  Was in shower AM, felt light headed then fell & hit his head  BIBEMS for fall, hit head   Denies LOC, loss of urine/ feces  v/s: 97.6F, , /82, 95% RA   EKG: NSR, No TWI, Ischemia or Infarction  UA neg for infection  CTH: Volume loss and chronic microvascular ischemic changes. CT CERVICAL SPINE: No fracture. High-grade bilateral C5-C6 and C6-C7. Foraminal stenosis due to advanced disc and uncovertebral joint degeneration.  Admitted to Tele for pre-sycnope w/ head strike    PT consulted  Vitals Q4hrs  f/u TTE    Cardio consulted: Dr. Purvis

## 2025-07-12 NOTE — ED PROVIDER NOTE - WR ORDER DATE AND TIME 1
12-Jul-2025 11:12 [FreeTextEntry1] : Asthma exacerbation\par \par add Symbicort 160/4.5 2 puffs twice per day\par \par continue nebulized albuterol up to 3 times per day\par Start on short course prednisone 20 mg PO for 5 days\par \par continue O2 and nebulzied albuterol\par aspiration precautions\par \par monitor hgb\par \par Francisco Rai MD [facility]

## 2025-07-12 NOTE — ED PROVIDER NOTE - OBJECTIVE STATEMENT
LR is a 79 y/o with a PMH of dementia, HLD, and HTN presenting after a fall this morning. LR is a 79 y/o with a PMH of dementia, HLD, and HTN presenting after a fall this morning. States that he fell in the shower at around 9-10am and hit is head. Denies LOC, dizziness and neck pain. Denies feeling lightheaded or dizzy prior to falling. Pt is unsure about anticoagulant use. States that he lives at home with his wife who called the ambulance. Pt is alert and oriented to person, place and time, but is slow to respond to questions and initially stated that year is 1995 before correcting himself. States that he is unsure about what medications he takes at home. Denies PSH or allergies.

## 2025-07-12 NOTE — H&P ADULT - NSHPPHYSICALEXAM_GEN_ALL_CORE
GENERAL: NAD  HEAD:  Normocephalic, L sided head hematoma  EYES: EOMI, PERRLA, conjunctiva and sclera clear  ENMT: No tonsillar erythema, exudates, or enlargement; Moist mucous membranes, Good dentition, No lesions  NECK: Supple, normal appearance, No JVD; Normal thyroid; Trachea midline  NERVOUS SYSTEM:  Alert & Oriented X2,  Motor Strength 5/5 B/L upper and lower extremities, sensation intact  CHEST/LUNG: Lungs clear to auscultation bilaterally, No rales, rhonchi, wheezing   HEART: Regular rate and rhythm; No murmurs, rubs, or gallops  ABDOMEN: Soft, Nontender, Nondistended; Bowel sounds present, lower abdomen TTP & large mass representing distended bladder  EXTREMITIES:  2+ Peripheral Pulses, No clubbing, cyanosis, or edema  SKIN: No rashes or lesions;  Good capillary refill

## 2025-07-12 NOTE — H&P ADULT - PROBLEM SELECTOR PLAN 3
p/w 97.6F, ****, /82, 95% RA, **WBC 19.26**  Lactate 3.6 on admission  RVP NEG  UA: Negative for UTI, Ketonuria & Glucosuria  CXR: Lungs clear, L hemidiaphragm elevation seen previously  WBC 19, L shift,   Lactate 3.6 > s/p 2L 3.3> 1L> REPEAT LACTATE  s/p CODE sepsis > CTX 1g  s/p bolus 3L  Dx w/ SIRS v sepsis w/ unknown source    c/w CTX q24  Trend lactate to normalization  Tylenol prn  f/u Bcx  consider holding CTX if no fevers, and nothing growing >24 hours cultures

## 2025-07-12 NOTE — ED PROVIDER NOTE - ENMT, MLM
Patient to have home O2 concentrator delivered to home and portable O2 tanks delivered to hospital prior to d/c.
Airway patent, Nasal mucosa clear. Mouth with normal mucosa. Throat has no vesicles, no oropharyngeal exudates and uvula is midline.

## 2025-07-12 NOTE — ED PROVIDER NOTE - ATTENDING CONTRIBUTION TO CARE
well appearing  male, left frontal hematoma, A&Ox2, presenting after reported fall. patient denied headache, dizziness or shortness of breath. on arrival patient tachycardic so concerned for underlying etiology as cause for fall. patient found to have leukocytosis, code sepsis called. will admit.

## 2025-07-12 NOTE — H&P ADULT - PROBLEM SELECTOR PLAN 2
C/o abdominal pain that is new as per pt  PE significant for lower abdominal mass consistent w/ bladder   Bladder scan: significant for >700mL  Barth placed    consider TOV in AM

## 2025-07-12 NOTE — ED PROVIDER NOTE - CLINICAL SUMMARY MEDICAL DECISION MAKING FREE TEXT BOX
LR is a 75 y/o with a PMH of dementia, HTN, HLD presenting after a fall and hitting his head this morning. Physical exam is notable for a contusion on the left temple. Given age and sign of head trauma, head CT scan has been ordered to rule out intercranial hemorrhage and traumatic brain injury.

## 2025-07-12 NOTE — PATIENT PROFILE ADULT - PRO INTERPRETER NEED 2
----- Message from ASYA Alicia CNM sent at 3/2/2021  2:20 PM CST -----  Regarding: Late care, missed visits, Teen Pregnancy  Triage,    I have put in Care Coordination referral who could not reach her after her NOB.    She missed her appt today.   Can we put her on the list to call to see if we can get her rescheduled for a PNV.  Send a Certified Letter if we can not reach her to document our attempts.    Jayden SALES     Serbian

## 2025-07-12 NOTE — ED ADULT NURSE NOTE - NSFALLHARMRISKINTERV_ED_ALL_ED
Assistance OOB with selected safe patient handling equipment if applicable/Assistance with ambulation/Communicate risk of Fall with Harm to all staff, patient, and family/Monitor gait and stability/Provide visual cue: red socks, yellow wristband, yellow gown, etc/Reinforce activity limits and safety measures with patient and family/Bed in lowest position, wheels locked, appropriate side rails in place/Call bell, personal items and telephone in reach/Instruct patient to call for assistance before getting out of bed/chair/stretcher/Non-slip footwear applied when patient is off stretcher/[place] to call system/Physically safe environment - no spills, clutter or unnecessary equipment/Purposeful Proactive Rounding/Room/bathroom lighting operational, light cord in reach

## 2025-07-12 NOTE — H&P ADULT - PROBLEM SELECTOR PLAN 5
History of DM on januvia  hold po meds  Glucose 423, Acetone serum moderate, VBG 7.29, bicarb 24, CO2 50, AG 12  not in DKA or HHS  s/p 10 admelog in ED    started 13 glargine & 4 premeals  ISS, fingersticks ACHS  f/u A1c

## 2025-07-12 NOTE — ED PROVIDER NOTE - IV ALTEPLASE EXCL REL HIDDEN
2023      Rosalie MOORE [facility]  [address] [place] [id number]              Dear Rosalie,      To ensure we are providing the best quality care, we have reviewed your chart and see that you are due for:    Colon Cancer Screening:    If you have received a referral to schedule a Colonoscopy or choose to do a Colonoscopy instead of the FIT/ Cologuard test, please call [phone] to schedule.    If you have received a FIT test kit from a prior office visit, please check the expiration date. If , please get a new kit from the Lab/ Clinic. If not , please complete the test and mail in as soon as you are able.    If you would prefer to complete a Cologuard test, please reach out to your provider to see if this is an option for you.    You may call Dept: [phone] if you have any questions. If you have completed the tests outside of [facility], please have the results forwarded to our office Fax # [phone]. We will update the chart for your primary Physician to review before your next annual physical.        Sincerely,      Griselda Spencer MD     show

## 2025-07-12 NOTE — H&P ADULT - NSHPREVIEWOFSYSTEMS_GEN_ALL_CORE
CONSTITUTIONAL: No fever, weight loss, or fatigue  EYES: No eye pain, visual disturbances, or discharge  ENT:  No difficulty hearing, tinnitus, vertigo; No sinus or throat pain  NECK: No pain or stiffness  RESPIRATORY: No cough, wheezing, chills or hemoptysis; No Shortness of Breath  CARDIOVASCULAR: No chest pain, palpitations, dizziness, or leg swelling  GASTROINTESTINAL: No abdominal or epigastric pain. No nausea, vomiting, or hematemesis; No diarrhea or constipation. No melena or hematochezia.  GENITOURINARY: No dysuria, frequency, hematuria, or incontinence  NEUROLOGICAL: No headaches, memory loss, loss of strength, numbness, or tremors  SKIN: No itching, burning, rashes, or lesions   LYMPH Nodes: No enlarged glands  ENDOCRINE: No heat or cold intolerance; No hair loss  MUSCULOSKELETAL: No joint pain or swelling; No muscle, back, No extremity pain  PSYCHIATRIC: No depression, anxiety, mood swings, or difficulty sleeping  HEME/LYMPH: No easy bruising, or bleeding gums  ALLERGY AND IMMUNOLOGIC: No hives or eczema CONSTITUTIONAL: No fever, weight loss, or fatigue  EYES: No eye pain, visual disturbances, or discharge  ENT:  No difficulty hearing, tinnitus, vertigo; No sinus/ throat pain  NECK: No pain or stiffness  RESPIRATORY: No cough, wheezing, chills or hemoptysis, shortness of Breath  CARDIOVASCULAR: No CP, palpitations, passing out, lightheadedness, leg swelling  GASTROINTESTINAL: No abdominal or epigastric pain. No nausea, vomiting, or hematemesis; No diarrhea or constipation. No melena or hematochezia.  GENITOURINARY: No dysuria, frequency, hematuria, or incontinence  NEUROLOGICAL: No headaches, memory loss, loss of strength, numbness, or tremors  SKIN: No itching, burning, rashes, or lesions   MUSCULOSKELETAL: No joint pain or swelling; No muscle, back, No extremity pain

## 2025-07-12 NOTE — H&P ADULT - PROBLEM SELECTOR PLAN 4
VBG 7.29, bicarb 24, CO2 50, AG 12  Lactate 3.6 > s/p 2L 3.3> 1L> 4.10 > singh placed  pt with no other signs of end organ damage    trend to normalization VBG 7.29, bicarb 24, CO2 50, AG 12  Lactate 3.6 > s/p 2L 3.3> 1L> 4.10 > singh placed > 2.8  pt with no other signs of end organ damage    trend to normalization  f/u in AM

## 2025-07-12 NOTE — H&P ADULT - HISTORY OF PRESENT ILLNESS
80M Dementia, HLD, & HTN BIBEMS for fall AM. States that he fell in the shower at around 9-10am and hit is head. Denies LOC, dizziness and neck pain. Denies feeling lightheaded or dizzy prior to falling. Pt is unsure about AC use. States that he lives at home with his wife who called the ambulance. Pt AAOx3, but is slow to respond to questions and initially stated that year is 1995 before correcting himself. States that he is unsure about what medications he takes at home. Denies PSH or allergies.    Syncope, sepsis 2/2 ???, HTN, HLD, dementia, Hyperglycemia    In the ED,   v/s: 97.6F, , /82, 95% RA  Labs: WBC 19, L shift, BUN/Cr 20/1.67, Glucose 423, trop 16.6, Acetone serum moderate, VBG 7.29, bicarb 24, CO2 50, AG 12, Lactate 3.6 > s/p 2L 3.3> 1L> REPEAT LACTATE    EKG:   UA: Ketonuria & Glucosuria, F/U MICROSCOPY  Limited RVP: Neg  CXR: Lungs clear, L hemidiaphragm elevation seen previously  CTH: Volume loss and chronic microvascular ischemic changes. CT CERVICAL SPINE: No fracture. High-grade bilateral C5-C6 and C6-C7. Foraminal stenosis due to advanced disc and uncovertebral joint degeneration.  s/p CODE sepsis > Vancomycin & CTX  s/p bolus 3L 80M Dementia, HLD, & HTN BIBEMS for fall AM. States that he fell in the shower at around 9-10am and hit is head. Denies LOC, dizziness and neck pain. Denies feeling lightheaded or dizzy prior to falling. Pt is unsure about AC use. States that he lives at home with his wife who called the ambulance. Pt AAOx3, but is slow to respond to questions and initially stated that year is 1995 before correcting himself. States that he is unsure about what medications he takes at home. Denies PSH or allergies.    Syncope, SIRS (sepsis 2/2 unknown source), HTN, HLD, dementia, Hyperglycemia, Urinary Retension, Elevated Lactate    In the ED,   v/s: 97.6F, , /82, 95% RA  Labs: WBC 19, L shift, BUN/Cr 20/1.67, Glucose 423, trop 16.6, Acetone serum moderate, VBG 7.29, bicarb 24, CO2 50, AG 12, Lactate 3.6 > s/p 2L 3.3> 1L> REPEAT LACTATE    EKG:   UA: NEGATIVE, Ketonuria & Glucosuria, MICROSCOPY  Limited RVP: Neg  CXR: Lungs clear, L hemidiaphragm elevation seen previously  CTH: Volume loss and chronic microvascular ischemic changes. CT CERVICAL SPINE: No fracture. High-grade bilateral C5-C6 and C6-C7. Foraminal stenosis due to advanced disc and uncovertebral joint degeneration.  s/p CODE sepsis > CTX  s/p bolus 3L 80M Dementia, HLD, & HTN BIBEMS for fall AM. States that he fell in the shower at around 9-10am and hit is head. Denies LOC, dizziness and neck pain. Denies feeling lightheaded or dizzy prior to falling. Pt is unsure about AC use. States that he lives at home with his wife who called the ambulance. Pt AAOx3, but is slow to respond to questions and initially stated that year is 1995 before correcting himself. States that he is unsure about what medications he takes at home. Denies PSH or allergies.    Pre-Syncope, SIRS (sepsis 2/2 unknown source), HTN, HLD, dementia, Hyperglycemia, Urinary Retension, Elevated Lactate    abdominal pain    collateral obtained by wife, did not     In the ED,   v/s: 97.6F, , /82, 95% RA  Labs: WBC 19, L shift, BUN/Cr 20/1.67, Glucose 423, trop 16.6, Acetone serum moderate, VBG 7.29, bicarb 24, CO2 50, AG 12, Lactate 3.6 > s/p 2L 3.3> 1L> REPEAT LACTATE    EKG:   UA: NEGATIVE, Ketonuria & Glucosuria, MICROSCOPY  Limited RVP: Neg  CXR: Lungs clear, L hemidiaphragm elevation seen previously  CTH: Volume loss and chronic microvascular ischemic changes. CT CERVICAL SPINE: No fracture. High-grade bilateral C5-C6 and C6-C7. Foraminal stenosis due to advanced disc and uncovertebral joint degeneration.  s/p CODE sepsis > CTX  s/p bolus 3L 80M Dementia (baseline AAO 2-3), HLD, & HTN BIBEMS for fall AM. Pt poor historian. States that he was in the shower in the morning and felt dizzy then fell and hit his head. Denies loss of consciousness, any medical history, neck pain, changes in vision, headache. As per pt, he takes only 1 pill for DM. Also c/o abdominal pain that is new as per pt. Collateral obtained by wife who went to his side & witnessed the fall. Confirmed no LOC. Wife called ambulance.     Pre-Syncope, SIRS (sepsis 2/2 unknown source), HTN, HLD, dementia, Hyperglycemia, Urinary Retension, Elevated Lactate    In the ED,   v/s: 97.6F, , /82, 95% RA  Labs: WBC 19, L shift, BUN/Cr 20/1.67, Glucose 423, trop 16.6, Acetone serum moderate, VBG 7.29, bicarb 24, CO2 50, AG 12, Lactate 3.6 > s/p 2L 3.3> 1L> REPEAT LACTATE    EKG: NSR, No TWI, Ischemia or Infarction  UA: Negative for UTI, Ketonuria & Glucosuria  Limited RVP: Neg  CXR: Lungs clear, L hemidiaphragm elevation seen previously  CTH: Volume loss and chronic microvascular ischemic changes. CT CERVICAL SPINE: No fracture. High-grade bilateral C5-C6 and C6-C7. Foraminal stenosis due to advanced disc and uncovertebral joint degeneration.  s/p CODE sepsis > CTX  s/p bolus 3L  Bladder scan: significant for >700mL, singh placed

## 2025-07-12 NOTE — H&P ADULT - ASSESSMENT
80M Dementia (baseline AAO 2-3), HLD, & HTN BIBEMS for fall AM. Pt poor historian. Admitted to medicine for pre-syncope, SIRS v sepsis 2/2 unknown source,     HTN, HLD, dementia, Hyperglycemia, Urinary Retension, Elevated Lactate    In the ED,   v/s: 97.6F, , /82, 95% RA  Labs: WBC 19, L shift, BUN/Cr 20/1.67, Glucose 423, trop 16.6, Acetone serum moderate, VBG 7.29, bicarb 24, CO2 50, AG 12, Lactate 3.6 > s/p 2L 3.3> 1L> REPEAT LACTATE    EKG: NSR, No TWI, Ischemia or Infarction  UA: Negative for UTI, Ketonuria & Glucosuria  Limited RVP: Neg  CXR: Lungs clear, L hemidiaphragm elevation seen previously  CTH: Volume loss and chronic microvascular ischemic changes. CT CERVICAL SPINE: No fracture. High-grade bilateral C5-C6 and C6-C7. Foraminal stenosis due to advanced disc and uncovertebral joint degeneration.  s/p CODE sepsis > CTX  s/p bolus 3L  Bladder scan: significant for >700mL, singh placed   80M Dementia (baseline AAO 2-3), HLD, & HTN BIBEMS for fall AM. Pt poor historian. Admitted to medicine for pre-syncope, SIRS v sepsis 2/2 unknown source,     In the ED,   v/s: 97.6F, , /82, 95% RA  Labs: WBC 19, L shift, BUN/Cr 20/1.67, Glucose 423, trop 16.6, Acetone serum moderate, VBG 7.29, bicarb 24, CO2 50, AG 12, Lactate 3.6 > s/p 2L 3.3> 1L> REPEAT LACTATE    EKG: NSR, No TWI, Ischemia or Infarction  UA: Negative for UTI, Ketonuria & Glucosuria  Limited RVP: Neg  CXR: Lungs clear, L hemidiaphragm elevation seen previously  CTH: Volume loss and chronic microvascular ischemic changes. CT CERVICAL SPINE: No fracture. High-grade bilateral C5-C6 and C6-C7. Foraminal stenosis due to advanced disc and uncovertebral joint degeneration.  s/p CODE sepsis > CTX  s/p bolus 3L  Bladder scan: significant for >700mL, singh placed   80M Dementia (baseline AAO 2-3), HLD, & HTN BIBEMS for fall AM. Pt poor historian. Admitted to medicine for pre-syncope, SIRS v sepsis 2/2 unknown source, & urinary retention    MED REC IN AM > AS PER PT AND WIFE ONLY TAKES METFORMIN ONCE A DAY; PHARMACY SAYS TAKES Januvia 100 & Benazepril 20mg. WIFE TO BRING IN MEDS IN AM.

## 2025-07-13 DIAGNOSIS — A41.9 SEPSIS, UNSPECIFIED ORGANISM: ICD-10-CM

## 2025-07-13 LAB
A1C WITH ESTIMATED AVERAGE GLUCOSE RESULT: 12.2 % — HIGH (ref 4–5.6)
ANION GAP SERPL CALC-SCNC: 5 MMOL/L — SIGNIFICANT CHANGE UP (ref 5–17)
B-OH-BUTYR SERPL-SCNC: 3.3 MMOL/L — HIGH
BUN SERPL-MCNC: 16 MG/DL — SIGNIFICANT CHANGE UP (ref 7–18)
CALCIUM SERPL-MCNC: 9.2 MG/DL — SIGNIFICANT CHANGE UP (ref 8.4–10.5)
CHLORIDE SERPL-SCNC: 106 MMOL/L — SIGNIFICANT CHANGE UP (ref 96–108)
CO2 SERPL-SCNC: 28 MMOL/L — SIGNIFICANT CHANGE UP (ref 22–31)
CREAT ?TM UR-MCNC: 177 MG/DL — SIGNIFICANT CHANGE UP
CREAT SERPL-MCNC: 1.03 MG/DL — SIGNIFICANT CHANGE UP (ref 0.5–1.3)
EGFR: 73 ML/MIN/1.73M2 — SIGNIFICANT CHANGE UP
EGFR: 73 ML/MIN/1.73M2 — SIGNIFICANT CHANGE UP
ESTIMATED AVERAGE GLUCOSE: 303 MG/DL — HIGH (ref 68–114)
GLUCOSE BLDC GLUCOMTR-MCNC: 158 MG/DL — HIGH (ref 70–99)
GLUCOSE BLDC GLUCOMTR-MCNC: 165 MG/DL — HIGH (ref 70–99)
GLUCOSE BLDC GLUCOMTR-MCNC: 277 MG/DL — HIGH (ref 70–99)
GLUCOSE BLDC GLUCOMTR-MCNC: 355 MG/DL — HIGH (ref 70–99)
GLUCOSE SERPL-MCNC: 297 MG/DL — HIGH (ref 70–99)
HCT VFR BLD CALC: 38.2 % — LOW (ref 39–50)
HGB BLD-MCNC: 13.2 G/DL — SIGNIFICANT CHANGE UP (ref 13–17)
LACTATE SERPL-SCNC: 1.3 MMOL/L — SIGNIFICANT CHANGE UP (ref 0.7–2)
MAGNESIUM SERPL-MCNC: 1.9 MG/DL — SIGNIFICANT CHANGE UP (ref 1.6–2.6)
MCHC RBC-ENTMCNC: 31.4 PG — SIGNIFICANT CHANGE UP (ref 27–34)
MCHC RBC-ENTMCNC: 34.6 G/DL — SIGNIFICANT CHANGE UP (ref 32–36)
MCV RBC AUTO: 91 FL — SIGNIFICANT CHANGE UP (ref 80–100)
NRBC BLD AUTO-RTO: 0 /100 WBCS — SIGNIFICANT CHANGE UP (ref 0–0)
OSMOLALITY UR: 870 MOS/KG — SIGNIFICANT CHANGE UP (ref 50–1200)
PHOSPHATE SERPL-MCNC: 2.7 MG/DL — SIGNIFICANT CHANGE UP (ref 2.5–4.5)
PLATELET # BLD AUTO: 221 K/UL — SIGNIFICANT CHANGE UP (ref 150–400)
POTASSIUM SERPL-MCNC: 3.9 MMOL/L — SIGNIFICANT CHANGE UP (ref 3.5–5.3)
POTASSIUM SERPL-SCNC: 3.9 MMOL/L — SIGNIFICANT CHANGE UP (ref 3.5–5.3)
POTASSIUM UR-SCNC: 54 MMOL/L — SIGNIFICANT CHANGE UP
PROT ?TM UR-MCNC: 147 MG/DL — HIGH (ref 0–12)
PROT/CREAT UR-RTO: 0.8 RATIO — HIGH (ref 0–0.2)
RBC # BLD: 4.2 M/UL — SIGNIFICANT CHANGE UP (ref 4.2–5.8)
RBC # FLD: 12.4 % — SIGNIFICANT CHANGE UP (ref 10.3–14.5)
SODIUM SERPL-SCNC: 139 MMOL/L — SIGNIFICANT CHANGE UP (ref 135–145)
SODIUM UR-SCNC: 113 MMOL/L — SIGNIFICANT CHANGE UP
UUN UR-MCNC: 848 MG/DL — SIGNIFICANT CHANGE UP
WBC # BLD: 10.92 K/UL — HIGH (ref 3.8–10.5)
WBC # FLD AUTO: 10.92 K/UL — HIGH (ref 3.8–10.5)

## 2025-07-13 PROCEDURE — 84300 ASSAY OF URINE SODIUM: CPT

## 2025-07-13 PROCEDURE — 97162 PT EVAL MOD COMPLEX 30 MIN: CPT

## 2025-07-13 PROCEDURE — 93970 EXTREMITY STUDY: CPT | Mod: 26

## 2025-07-13 PROCEDURE — 82947 ASSAY GLUCOSE BLOOD QUANT: CPT

## 2025-07-13 PROCEDURE — 82570 ASSAY OF URINE CREATININE: CPT

## 2025-07-13 PROCEDURE — 85610 PROTHROMBIN TIME: CPT

## 2025-07-13 PROCEDURE — 85025 COMPLETE CBC W/AUTO DIFF WBC: CPT

## 2025-07-13 PROCEDURE — 71045 X-RAY EXAM CHEST 1 VIEW: CPT

## 2025-07-13 PROCEDURE — 82010 KETONE BODYS QUAN: CPT

## 2025-07-13 PROCEDURE — 93970 EXTREMITY STUDY: CPT

## 2025-07-13 PROCEDURE — 82803 BLOOD GASES ANY COMBINATION: CPT

## 2025-07-13 PROCEDURE — 87040 BLOOD CULTURE FOR BACTERIA: CPT

## 2025-07-13 PROCEDURE — 80048 BASIC METABOLIC PNL TOTAL CA: CPT

## 2025-07-13 PROCEDURE — 72125 CT NECK SPINE W/O DYE: CPT

## 2025-07-13 PROCEDURE — 84133 ASSAY OF URINE POTASSIUM: CPT

## 2025-07-13 PROCEDURE — 84484 ASSAY OF TROPONIN QUANT: CPT

## 2025-07-13 PROCEDURE — 87637 SARSCOV2&INF A&B&RSV AMP PRB: CPT

## 2025-07-13 PROCEDURE — 70450 CT HEAD/BRAIN W/O DYE: CPT

## 2025-07-13 PROCEDURE — 84540 ASSAY OF URINE/UREA-N: CPT

## 2025-07-13 PROCEDURE — 83605 ASSAY OF LACTIC ACID: CPT

## 2025-07-13 PROCEDURE — 84295 ASSAY OF SERUM SODIUM: CPT

## 2025-07-13 PROCEDURE — 83735 ASSAY OF MAGNESIUM: CPT

## 2025-07-13 PROCEDURE — 85730 THROMBOPLASTIN TIME PARTIAL: CPT

## 2025-07-13 PROCEDURE — 36415 COLL VENOUS BLD VENIPUNCTURE: CPT

## 2025-07-13 PROCEDURE — 82962 GLUCOSE BLOOD TEST: CPT

## 2025-07-13 PROCEDURE — 84132 ASSAY OF SERUM POTASSIUM: CPT

## 2025-07-13 PROCEDURE — 84156 ASSAY OF PROTEIN URINE: CPT

## 2025-07-13 PROCEDURE — 82009 KETONE BODYS QUAL: CPT

## 2025-07-13 PROCEDURE — 83935 ASSAY OF URINE OSMOLALITY: CPT

## 2025-07-13 PROCEDURE — 85027 COMPLETE CBC AUTOMATED: CPT

## 2025-07-13 PROCEDURE — 72170 X-RAY EXAM OF PELVIS: CPT

## 2025-07-13 PROCEDURE — 81001 URINALYSIS AUTO W/SCOPE: CPT

## 2025-07-13 PROCEDURE — 82330 ASSAY OF CALCIUM: CPT

## 2025-07-13 PROCEDURE — 84100 ASSAY OF PHOSPHORUS: CPT

## 2025-07-13 PROCEDURE — 83036 HEMOGLOBIN GLYCOSYLATED A1C: CPT

## 2025-07-13 PROCEDURE — 80053 COMPREHEN METABOLIC PANEL: CPT

## 2025-07-13 RX ORDER — CEFTRIAXONE 500 MG/1
1000 INJECTION, POWDER, FOR SOLUTION INTRAMUSCULAR; INTRAVENOUS EVERY 24 HOURS
Refills: 0 | Status: DISCONTINUED | OUTPATIENT
Start: 2025-07-13 | End: 2025-07-15

## 2025-07-13 RX ADMIN — INSULIN LISPRO 6: 100 INJECTION, SOLUTION INTRAVENOUS; SUBCUTANEOUS at 07:42

## 2025-07-13 RX ADMIN — INSULIN LISPRO 4 UNIT(S): 100 INJECTION, SOLUTION INTRAVENOUS; SUBCUTANEOUS at 07:42

## 2025-07-13 RX ADMIN — HEPARIN SODIUM 5000 UNIT(S): 1000 INJECTION INTRAVENOUS; SUBCUTANEOUS at 17:54

## 2025-07-13 RX ADMIN — INSULIN LISPRO 10: 100 INJECTION, SOLUTION INTRAVENOUS; SUBCUTANEOUS at 12:01

## 2025-07-13 RX ADMIN — INSULIN LISPRO 2: 100 INJECTION, SOLUTION INTRAVENOUS; SUBCUTANEOUS at 16:50

## 2025-07-13 RX ADMIN — INSULIN LISPRO 4 UNIT(S): 100 INJECTION, SOLUTION INTRAVENOUS; SUBCUTANEOUS at 16:50

## 2025-07-13 RX ADMIN — CEFTRIAXONE 100 MILLIGRAM(S): 500 INJECTION, POWDER, FOR SOLUTION INTRAMUSCULAR; INTRAVENOUS at 05:13

## 2025-07-13 RX ADMIN — INSULIN LISPRO 4 UNIT(S): 100 INJECTION, SOLUTION INTRAVENOUS; SUBCUTANEOUS at 12:01

## 2025-07-13 RX ADMIN — HEPARIN SODIUM 5000 UNIT(S): 1000 INJECTION INTRAVENOUS; SUBCUTANEOUS at 05:13

## 2025-07-13 NOTE — CONSULT NOTE ADULT - SUBJECTIVE AND OBJECTIVE BOX
INTERNAL MEDICINE CONSULT NOTE      BRANDON OAKES  MRN-[id number]      HISTORY OF PRESENT ILLNESS:  80M Dementia (baseline AAO 2-3), HLD, & HTN BIBEMS for fall AM. Pt poor historian. States that he was in the shower in the morning and felt dizzy then fell and hit his head. Denies loss of consciousness, any medical history, neck pain, changes in vision, headache. As per pt, he takes only 1 pill for DM. Also c/o abdominal pain that is new as per pt. Collateral obtained by wife who went to his side & witnessed the fall. Confirmed no LOC. Wife called ambulance.     Pre-Syncope, SIRS (sepsis 2/2 unknown source), HTN, HLD, dementia, Hyperglycemia, Urinary Retension, Elevated Lactate      MEDICATIONS  (STANDING):  cefTRIAXone   IVPB 1000 milliGRAM(s) IV Intermittent every 24 hours  dextrose 5%. 1000 milliLiter(s) (50 mL/Hr) IV Continuous <Continuous>  dextrose 5%. 1000 milliLiter(s) (100 mL/Hr) IV Continuous <Continuous>  dextrose 50% Injectable 25 Gram(s) IV Push once  dextrose 50% Injectable 12.5 Gram(s) IV Push once  dextrose 50% Injectable 25 Gram(s) IV Push once  glucagon  Injectable 1 milliGRAM(s) IntraMuscular once  heparin   Injectable 5000 Unit(s) SubCutaneous every 12 hours  insulin glargine Injectable (LANTUS) 13 Unit(s) SubCutaneous at bedtime  insulin lispro (ADMELOG) corrective regimen sliding scale   SubCutaneous three times a day before meals  insulin lispro Injectable (ADMELOG) 4 Unit(s) SubCutaneous three times a day before meals      MEDICATIONS  (PRN):  acetaminophen     Tablet .. 650 milliGRAM(s) Oral every 6 hours PRN Temp greater or equal to 38C (100.4F), Mild Pain (1 - 3)  aluminum hydroxide/magnesium hydroxide/simethicone Suspension 30 milliLiter(s) Oral every 4 hours PRN Dyspepsia  dextrose Oral Gel 15 Gram(s) Oral once PRN Blood Glucose LESS THAN 70 milliGRAM(s)/deciliter  ondansetron Injectable 4 milliGRAM(s) IV Push every 8 hours PRN Nausea and/or Vomiting      Allergies    No Known Allergies    Intolerances        PAST MEDICAL & SURGICAL HISTORY:  Diabetes mellitus      Hypertension      Hyperlipidemia      No significant past surgical history          FAMILY HISTORY:      SOCIAL HISTORY  Smoking History:     REVIEW OF SYSTEMS:    CONSTITUTIONAL:  No fevers, chills, sweats    HEENT:  Eyes:  No diplopia or blurred vision. ENT:  No earache, sore throat or runny nose.    CARDIOVASCULAR:  No pressure, squeezing, tightness, or heaviness about the chest; no palpitations.    RESPIRATORY:  Per HPI    GASTROINTESTINAL:  No abdominal pain, nausea, vomiting or diarrhea.    GENITOURINARY:  No dysuria, frequency or urgency.    NEUROLOGIC:  No paresthesias, fasciculations, seizures or weakness.    PSYCHIATRIC:  No disorder of thought or mood.    Vital Signs Last 24 Hrs  T(C): 37 (13 Jul 2025 07:44), Max: 37 (12 Jul 2025 13:10)  T(F): 98.6 (13 Jul 2025 07:44), Max: 98.6 (12 Jul 2025 13:10)  HR: 81 (13 Jul 2025 07:44) (81 - 133)  BP: 124/82 (13 Jul 2025 07:44) (109/77 - 163/94)  BP(mean): --  RR: 18 (13 Jul 2025 07:44) (16 - 18)  SpO2: 95% (13 Jul 2025 07:44) (95% - 97%)    Parameters below as of 13 Jul 2025 07:44  Patient On (Oxygen Delivery Method): room air      I&O's Detail    12 Jul 2025 07:01  -  13 Jul 2025 07:00  --------------------------------------------------------  IN:  Total IN: 0 mL    OUT:    Voided (mL): 1200 mL  Total OUT: 1200 mL    Total NET: -1200 mL          PHYSICAL EXAMINATION:    GENERAL: The patient is a well-developed, well-nourished _____in no apparent distress.     HEENT: Head is normocephalic and atraumatic. Extraocular muscles are intact. Mucous membranes are moist.     NECK: Supple.     LUNGS: Clear to auscultation without wheezing, rales, or rhonchi. Respirations unlabored    HEART: Regular rate and rhythm without murmur.    ABDOMEN: Soft, nontender, and nondistended.  No hepatosplenomegaly is noted.    EXTREMITIES: Without any cyanosis, clubbing, rash, lesions or edema.    NEUROLOGIC: Grossly intact.      LABS:                        16.2   19.26 )-----------( 275      ( 12 Jul 2025 12:20 )             46.7     07-12    140  |  107  |  17  ----------------------------<  312[H]  4.1   |  27  |  1.36[H]    Ca    9.3      12 Jul 2025 22:17  Mg     1.7     07-12    TPro  7.2  /  Alb  4.1  /  TBili  0.7  /  DBili  x   /  AST  19  /  ALT  23  /  AlkPhos  84  07-12    PT/INR - ( 12 Jul 2025 13:10 )   PT: 12.5 sec;   INR: 1.07 ratio         PTT - ( 12 Jul 2025 13:10 )  PTT:27.5 sec  Urinalysis Basic - ( 12 Jul 2025 22:17 )    Color: x / Appearance: x / SG: x / pH: x  Gluc: 312 mg/dL / Ketone: x  / Bili: x / Urobili: x   Blood: x / Protein: x / Nitrite: x   Leuk Esterase: x / RBC: x / WBC x   Sq Epi: x / Non Sq Epi: x / Bacteria: x          Lactate, Blood: 2.8 mmol/L (07-12-25 @ 22:17)  Lactate, Blood: 3.3 mmol/L (07-12-25 @ 14:20)  Lactate, Blood: 3.6 mmol/L (07-12-25 @ 13:10)    FluA/FluB/RSV/COVID PCR (07.12.25 @ 13:10)   SARS-CoV-2 Result: NotDete: This molecular assay uses polymerase chain reaction (PCR) to detect   influenza A virus, influenza B virus, respiratory syncytial virus (RSV),   and SARS-CoV-2 (COVID-19). This test was validated by [facility]   and is in use under the FDA Emergency Use Authorization (EUA) for   clinical labs CLIA-certified to perform high complexity testing. Test   results should be correlated with clinical presentation, patient history,   and epidemiology.  Influenza A Result: [facility]  Influenza B Result: [facility]  Resp Syn Virus Result: NotDetec  Source Respiratory: Nasopharyngeal    MICROBIOLOGY:    RADIOLOGY & ADDITIONAL STUDIES:    CXR:    < from: CT Head No Cont (07.12.25 @ 12:11) >  IMPRESSION:    CT BRAIN: No acute intracranial bleeding.  Volume loss and chronic microvascular ischemic changes.    CT CERVICAL SPINE: No fracture. High-grade bilateral C5-C6 and C6-C7   foraminal stenosis due to advanced disc and uncovertebral joint   degeneration.      < end of copied text >  Ct scan chest;    ekg;    echo: INTERNAL MEDICINE CONSULT NOTE      BRANDON OAKES  MRN-[id number]      HISTORY OF PRESENT ILLNESS:  80M Dementia (baseline AAO 2-3), HLD, & HTN BIBEMS for fall AM. Pt poor historian. States that he was in the shower in the morning and felt dizzy then fell and hit his head. Denies loss of consciousness, any medical history, neck pain, changes in vision, headache. As per pt, he takes only 1 pill for DM. Also c/o abdominal pain that is new as per pt. Collateral obtained by wife who went to his side & witnessed the fall. Confirmed no LOC. Wife called ambulance.     Pre-Syncope, SIRS (sepsis 2/2 unknown source), HTN, HLD, dementia, Hyperglycemia, Urinary Retension, Elevated Lactate      MEDICATIONS  (STANDING):  cefTRIAXone   IVPB 1000 milliGRAM(s) IV Intermittent every 24 hours  dextrose 5%. 1000 milliLiter(s) (50 mL/Hr) IV Continuous <Continuous>  dextrose 5%. 1000 milliLiter(s) (100 mL/Hr) IV Continuous <Continuous>  dextrose 50% Injectable 25 Gram(s) IV Push once  dextrose 50% Injectable 12.5 Gram(s) IV Push once  dextrose 50% Injectable 25 Gram(s) IV Push once  glucagon  Injectable 1 milliGRAM(s) IntraMuscular once  heparin   Injectable 5000 Unit(s) SubCutaneous every 12 hours  insulin glargine Injectable (LANTUS) 13 Unit(s) SubCutaneous at bedtime  insulin lispro (ADMELOG) corrective regimen sliding scale   SubCutaneous three times a day before meals  insulin lispro Injectable (ADMELOG) 4 Unit(s) SubCutaneous three times a day before meals      MEDICATIONS  (PRN):  acetaminophen     Tablet .. 650 milliGRAM(s) Oral every 6 hours PRN Temp greater or equal to 38C (100.4F), Mild Pain (1 - 3)  aluminum hydroxide/magnesium hydroxide/simethicone Suspension 30 milliLiter(s) Oral every 4 hours PRN Dyspepsia  dextrose Oral Gel 15 Gram(s) Oral once PRN Blood Glucose LESS THAN 70 milliGRAM(s)/deciliter  ondansetron Injectable 4 milliGRAM(s) IV Push every 8 hours PRN Nausea and/or Vomiting      Allergies    No Known Allergies    Intolerances        PAST MEDICAL & SURGICAL HISTORY:  Diabetes mellitus      Hypertension      Hyperlipidemia      No significant past surgical history          FAMILY HISTORY:      SOCIAL HISTORY  Smoking History:     REVIEW OF SYSTEMS:    CONSTITUTIONAL:  No fevers, chills, sweats    HEENT:  Eyes:  No diplopia or blurred vision. ENT:  No earache, sore throat or runny nose.    CARDIOVASCULAR:  No pressure, squeezing, tightness, or heaviness about the chest; no palpitations.    RESPIRATORY:  Per HPI    GASTROINTESTINAL:  No abdominal pain, nausea, vomiting or diarrhea.    GENITOURINARY:  No dysuria, frequency or urgency.    NEUROLOGIC:  No paresthesias, fasciculations, seizures or weakness.    PSYCHIATRIC:  No disorder of thought or mood.    Vital Signs Last 24 Hrs  T(C): 37 (13 Jul 2025 07:44), Max: 37 (12 Jul 2025 13:10)  T(F): 98.6 (13 Jul 2025 07:44), Max: 98.6 (12 Jul 2025 13:10)  HR: 81 (13 Jul 2025 07:44) (81 - 133)  BP: 124/82 (13 Jul 2025 07:44) (109/77 - 163/94)  BP(mean): --  RR: 18 (13 Jul 2025 07:44) (16 - 18)  SpO2: 95% (13 Jul 2025 07:44) (95% - 97%)    Parameters below as of 13 Jul 2025 07:44  Patient On (Oxygen Delivery Method): room air      I&O's Detail    12 Jul 2025 07:01  -  13 Jul 2025 07:00  --------------------------------------------------------  IN:  Total IN: 0 mL    OUT:    Voided (mL): 1200 mL  Total OUT: 1200 mL    Total NET: -1200 mL          PHYSICAL EXAMINATION:    GENERAL: The patient is a well-developed, well-nourished _____in no apparent distress.     HEENT: Head is normocephalic and atraumatic. Extraocular muscles are intact. Mucous membranes are moist.     NECK: Supple.     LUNGS: Clear to auscultation without wheezing, rales, or rhonchi. Respirations unlabored    HEART: Regular rate and rhythm without murmur.    ABDOMEN: Soft, nontender, and nondistended.  No hepatosplenomegaly is noted.    EXTREMITIES: Without any cyanosis, clubbing, rash, lesions or edema.    NEUROLOGIC: Grossly intact.      LABS:                        16.2   19.26 )-----------( 275      ( 12 Jul 2025 12:20 )             46.7     07-12    140  |  107  |  17  ----------------------------<  312[H]  4.1   |  27  |  1.36[H]    Ca    9.3      12 Jul 2025 22:17  Mg     1.7     07-12    TPro  7.2  /  Alb  4.1  /  TBili  0.7  /  DBili  x   /  AST  19  /  ALT  23  /  AlkPhos  84  07-12    PT/INR - ( 12 Jul 2025 13:10 )   PT: 12.5 sec;   INR: 1.07 ratio         PTT - ( 12 Jul 2025 13:10 )  PTT:27.5 sec  Urinalysis Basic - ( 12 Jul 2025 22:17 )    Color: x / Appearance: x / SG: x / pH: x  Gluc: 312 mg/dL / Ketone: x  / Bili: x / Urobili: x   Blood: x / Protein: x / Nitrite: x   Leuk Esterase: x / RBC: x / WBC x   Sq Epi: x / Non Sq Epi: x / Bacteria: x          Lactate, Blood: 2.8 mmol/L (07-12-25 @ 22:17)  Lactate, Blood: 3.3 mmol/L (07-12-25 @ 14:20)  Lactate, Blood: 3.6 mmol/L (07-12-25 @ 13:10)    FluA/FluB/RSV/COVID PCR (07.12.25 @ 13:10)   SARS-CoV-2 Result: NotDete: This molecular assay uses polymerase chain reaction (PCR) to detect   influenza A virus, influenza B virus, respiratory syncytial virus (RSV),   and SARS-CoV-2 (COVID-19). This test was validated by [facility] [employer]   and is in use under the FDA Emergency Use Authorization (EUA) for   clinical labs CLIA-certified to perform high complexity testing. Test   results should be correlated with clinical presentation, patient history,   and epidemiology.  Influenza A Result: [facility]  Influenza B Result: [facility]  Resp Syn Virus Result: NotDetec  Source Respiratory: Nasopharyngeal    MICROBIOLOGY:    RADIOLOGY & ADDITIONAL STUDIES:    CXR:  < from: Xray Chest 1 View- PORTABLE-Urgent (Xray Chest 1 View- PORTABLE-Urgent .) (07.12.25 @ 14:49) >  IMPRESSION: Possible small left-sided effusion/atelectasis. Cardiomegaly.   Consider chest CT.      < end of copied text >    < from: CT Head No Cont (07.12.25 @ 12:11) >  IMPRESSION:    CT BRAIN: No acute intracranial bleeding.  Volume loss and chronic microvascular ischemic changes.    CT CERVICAL SPINE: No fracture. High-grade bilateral C5-C6 and C6-C7   foraminal stenosis due to advanced disc and uncovertebral joint   degeneration.      < end of copied text >  Ct scan chest;    ekg;    echo:

## 2025-07-13 NOTE — CONSULT NOTE ADULT - PROBLEM SELECTOR RECOMMENDATION 3
Accuchecks with regular insulin coverage  HA1C Accuchecks with regular insulin coverage  HA1C  Endo eval

## 2025-07-13 NOTE — CONSULT NOTE ADULT - PROBLEM SELECTOR RECOMMENDATION 9
IVF  Avoid nephrotoxins  Monitor BMP  Renal US   Renal eval Panculture  Antibiotics  Monitor Temp and WBC  CXR   ID follow up Cont with meds  Supportive care Panculture  Antibiotics  Monitor Temp and WBC  CXR   ID Consult

## 2025-07-13 NOTE — CONSULT NOTE ADULT - SUBJECTIVE AND OBJECTIVE BOX
Date of Service  07-13-25 @ 11:19    CHIEF COMPLAINT:Patient is a 80y old  Male who presents with a chief complaint of Syncope.    HPI:  80M Dementia (baseline AAO 2-3), HLD, & HTN BIBEMS for fall AM. Pt poor historian. States that he was in the shower in the morning and felt dizzy then was on floor and hit his head. Denies any medical history, neck pain, changes in vision, headache. As per pt, he takes only 1 pill for DM. Also c/o abdominal pain that is new as per pt. Collateral obtained by wife who went to his side & witnessed the fall. Confirmed no LOC. Wife called ambulance.     Pre-Syncope, SIRS (sepsis 2/2 unknown source), HTN, HLD, dementia, Hyperglycemia, Urinary Retension, Elevated Lactate    In the ED,   v/s: 97.6F, , /82, 95% RA  Labs: WBC 19, L shift, BUN/Cr 20/1.67, Glucose 423, trop 16.6, Acetone serum moderate, VBG 7.29, bicarb 24, CO2 50, AG 12, Lactate 3.6 > s/p 2L 3.3> 1L> REPEAT LACTATE    EKG: NSR, No TWI, Ischemia or Infarction  UA: Negative for UTI, Ketonuria & Glucosuria  Limited RVP: Neg  CXR: Lungs clear, L hemidiaphragm elevation seen previously  CTH: Volume loss and chronic microvascular ischemic changes. CT CERVICAL SPINE: No fracture. High-grade bilateral C5-C6 and C6-C7. Foraminal stenosis due to advanced disc and uncovertebral joint degeneration.  s/p CODE sepsis > CTX  s/p bolus 3L  Bladder scan: significant for >700mL, singh placed (12 Jul 2025 16:03)      PAST MEDICAL & SURGICAL HISTORY:  Diabetes mellitus      Hypertension      Hyperlipidemia                MEDICATIONS  (STANDING):  cefTRIAXone   IVPB 1000 milliGRAM(s) IV Intermittent every 24 hours  dextrose 5%. 1000 milliLiter(s) (50 mL/Hr) IV Continuous <Continuous>  dextrose 5%. 1000 milliLiter(s) (100 mL/Hr) IV Continuous <Continuous>  dextrose 50% Injectable 25 Gram(s) IV Push once  dextrose 50% Injectable 12.5 Gram(s) IV Push once  dextrose 50% Injectable 25 Gram(s) IV Push once  glucagon  Injectable 1 milliGRAM(s) IntraMuscular once  heparin   Injectable 5000 Unit(s) SubCutaneous every 12 hours  insulin glargine Injectable (LANTUS) 13 Unit(s) SubCutaneous at bedtime  insulin lispro (ADMELOG) corrective regimen sliding scale   SubCutaneous three times a day before meals  insulin lispro Injectable (ADMELOG) 4 Unit(s) SubCutaneous three times a day before meals    MEDICATIONS  (PRN):  acetaminophen     Tablet .. 650 milliGRAM(s) Oral every 6 hours PRN Temp greater or equal to 38C (100.4F), Mild Pain (1 - 3)  aluminum hydroxide/magnesium hydroxide/simethicone Suspension 30 milliLiter(s) Oral every 4 hours PRN Dyspepsia  dextrose Oral Gel 15 Gram(s) Oral once PRN Blood Glucose LESS THAN 70 milliGRAM(s)/deciliter  ondansetron Injectable 4 milliGRAM(s) IV Push every 8 hours PRN Nausea and/or Vomiting      FAMILY HISTORY:No hx of CAD      SOCIAL HISTORY:    [ x] Non-smoker    [ x] Alcohol-denies    Allergies    No Known Allergies    Intolerances    	    REVIEW OF SYSTEMS:  CONSTITUTIONAL: No fever, weight loss, or fatigue  EYES: No eye pain, visual disturbances, or discharge  ENT:  No difficulty hearing, tinnitus, vertigo; No sinus or throat pain  NECK: No pain or stiffness  RESPIRATORY: No cough, wheezing, chills or hemoptysis; No Shortness of Breath  CARDIOVASCULAR: No chest pain, palpitations,+ passing out, + dizziness, or leg swelling  GASTROINTESTINAL: No abdominal or epigastric pain. No nausea, vomiting, or hematemesis; No diarrhea or constipation. No melena or hematochezia.  GENITOURINARY: No dysuria, frequency, hematuria, or incontinence  NEUROLOGICAL: No headaches, memory loss, loss of strength, numbness, or tremors  SKIN: No itching, burning, rashes, or lesions   LYMPH Nodes: No enlarged glands  ENDOCRINE: No heat or cold intolerance; No hair loss  MUSCULOSKELETAL: No joint pain or swelling; No muscle, back, or extremity pain  PSYCHIATRIC: No depression, anxiety, mood swings, or difficulty sleeping  HEME/LYMPH: No easy bruising, or bleeding gums  ALLERGY AND IMMUNOLOGIC: No hives or eczema	        PHYSICAL EXAM:  T(C): 36.9 (07-13-25 @ 10:57), Max: 37 (07-12-25 @ 13:10)  HR: 91 (07-13-25 @ 10:57) (81 - 111)  BP: 111/68 (07-13-25 @ 10:57) (109/77 - 163/94)  RR: 18 (07-13-25 @ 10:57) (18 - 18)  SpO2: 97% (07-13-25 @ 10:57) (95% - 97%)  Wt(kg): --  I&O's Summary    12 Jul 2025 07:01  -  13 Jul 2025 07:00  --------------------------------------------------------  IN: 0 mL / OUT: 1200 mL / NET: -1200 mL        Appearance: Normal	  HEENT:   Normal oral mucosa, PERRL, EOMI	  Lymphatic: No lymphadenopathy  Cardiovascular: Normal S1 S2, No JVD, No murmurs, No edema  Respiratory: Lungs clear to auscultation	  Psychiatry: A & O x 3, Mood & affect appropriate  Gastrointestinal:  Soft, Non-tender, + BS	  Skin: No rashes, No ecchymoses, No cyanosis	  Neurologic: Non-focal  Extremities: Normal range of motion, No clubbing, cyanosis or edema  Vascular: Peripheral pulses palpable 2+ bilaterally    TELEMETRY: sinus tach 120's	    ECG:  nsr,nl axis	  	  	  LABS:	 	          Troponin I, High Sensitivity Result: 16.6 ng/L (07-12-25 @ 12:20)                          13.2   10.92 )-----------( 221      ( 13 Jul 2025 07:35 )             38.2     07-13    139  |  106  |  16  ----------------------------<  297[H]  3.9   |  28  |  1.03    Ca    9.2      13 Jul 2025 07:35  Phos  2.7     07-13  Mg     1.9     07-13    TPro  7.2  /  Alb  4.1  /  TBili  0.7  /  DBili  x   /  AST  19  /  ALT  23  /  AlkPhos  84  07-12    < from: CT Head No Cont (07.12.25 @ 12:11) >  ACC: [id number] EXAM:  CT CERVICAL SPINE   ORDERED BY: GREGORIO MORALES     ACC: [id number] EXAM:  CT BRAIN   ORDERED BY: GREGORIO MORALES     PROCEDURE DATE:  07/12/2025          INTERPRETATION:  HISTORY: Trauma.    COMPARISON: None.    TECHNIQUE: Axial noncontrast CT images of the head and cervical spine   were obtained and submitted for interpretation. Sagittal and coronal   reformatted images of the cervical spine were provided. Bone and soft   tissue windows were evaluated.    FINDINGS:    CT BRAIN:    There is no acute intracranial mass-effect, hemorrhage, midline shift, or   abnormal extra-axial fluid collection. Gray-white differentiation is   maintained.    Patchy and confluent periventricular and deep cerebral white matter   hypoattenuation due to chronic microangiopathic ischemic changes.   Atheromatous calcifications along the carotid siphons.    Widened left occipital sulci (series 2 image 24) due to volume loss, due   to chronic ischemia/infarction.    Moderate cerebral volume loss. No hydrocephalus. Partially empty sella.   Basal cisterns are patent.    Visualized paranasal sinuses and mastoid air cells are clear. The   calvarium is intact.    CT CERVICAL SPINE:    Straightening of the cervical lordosis due to muscle spasm and/or   positioning. There is no prevertebral soft tissue swelling. Vertebral   body heights and alignment are maintained.    Multilevel mid cervical disc degeneration from C3-C4 through C6-C7 is   most significant at C5-C6 and C6-C7. Uncovertebral joint spurring   contributes to varying degrees of foraminal stenosis, high-grade at C5-C6   and C6-C7.    The atlantodental and atlanto-occipital joints are maintained. Articular   facets and posterior elements alignment is maintained.    The partially imaged lung apices are clear.    IMPRESSION:    CT BRAIN: No acute intracranial bleeding.  Volume loss and chronic microvascular ischemic changes.    CT CERVICAL SPINE: No fracture. High-grade bilateral C5-C6 and C6-C7   foraminal stenosis due to advanced disc and uncovertebral joint   degeneration.    --- End of Report ---            HERI RANDHAWA MD; Attending Radiologist  This document has been electronically signed. Jul 12 2025  1:23PM    < end of copied text >  < from: Xray Chest 1 View- PORTABLE-Urgent (Xray Chest 1 View- PORTABLE-Urgent .) (07.12.25 @ 14:49) >  ACC: [id number] EXAM:  XR CHEST PORTABLE URGENT 1V   ORDERED BY: GREGORIO MORALES     PROCEDURE DATE:  07/12/2025          INTERPRETATION:  TECHNIQUE: Single portable view of the chest.    COMPARISON:  10/24/2023    CLINICAL HISTORY: sepsis    FINDINGS:    Single frontal view of the chest demonstrates possible small left-sided   effusion/atelectasis. The cardiomediastinal silhouette is enlarged. No   acute osseous abnormalities. Overlying EKG leads and wires are noted.   Left hemidiaphragm is elevated.    IMPRESSION: Possible small left-sided effusion/atelectasis. Cardiomegaly.   Consider chest CT.    --- End of Report ---            DEENA METCALF MD; Attending Radiologist  This document has been electronically signed. Jul 13 2025  8:48AM    < end of copied text >

## 2025-07-13 NOTE — CONSULT NOTE ADULT - PROBLEM SELECTOR RECOMMENDATION 4
Accucheck with regular insulin coverage  IVF  HA1C  Endo eval Accuchecks with regular insulin coverage  IVF  HA1C  Endo eval Monitor BP  Cont with meds

## 2025-07-14 ENCOUNTER — RESULT REVIEW (OUTPATIENT)
Age: 80
End: 2025-07-14

## 2025-07-14 LAB
ANION GAP SERPL CALC-SCNC: 7 MMOL/L — SIGNIFICANT CHANGE UP (ref 5–17)
BUN SERPL-MCNC: 15 MG/DL — SIGNIFICANT CHANGE UP (ref 7–18)
CALCIUM SERPL-MCNC: 8.8 MG/DL — SIGNIFICANT CHANGE UP (ref 8.4–10.5)
CHLORIDE SERPL-SCNC: 104 MMOL/L — SIGNIFICANT CHANGE UP (ref 96–108)
CHOLEST SERPL-MCNC: 134 MG/DL — SIGNIFICANT CHANGE UP
CO2 SERPL-SCNC: 27 MMOL/L — SIGNIFICANT CHANGE UP (ref 22–31)
CREAT SERPL-MCNC: 1.02 MG/DL — SIGNIFICANT CHANGE UP (ref 0.5–1.3)
EGFR: 74 ML/MIN/1.73M2 — SIGNIFICANT CHANGE UP
EGFR: 74 ML/MIN/1.73M2 — SIGNIFICANT CHANGE UP
FOLATE SERPL-MCNC: 19.3 NG/ML — SIGNIFICANT CHANGE UP
GLUCOSE BLDC GLUCOMTR-MCNC: 240 MG/DL — HIGH (ref 70–99)
GLUCOSE BLDC GLUCOMTR-MCNC: 243 MG/DL — HIGH (ref 70–99)
GLUCOSE BLDC GLUCOMTR-MCNC: 322 MG/DL — HIGH (ref 70–99)
GLUCOSE SERPL-MCNC: 193 MG/DL — HIGH (ref 70–99)
HCT VFR BLD CALC: 38.7 % — LOW (ref 39–50)
HDLC SERPL-MCNC: 45 MG/DL — SIGNIFICANT CHANGE UP
HGB BLD-MCNC: 13.4 G/DL — SIGNIFICANT CHANGE UP (ref 13–17)
LDLC SERPL-MCNC: 64 MG/DL — SIGNIFICANT CHANGE UP
LIPID PNL WITH DIRECT LDL SERPL: 64 MG/DL — SIGNIFICANT CHANGE UP
MAGNESIUM SERPL-MCNC: 1.7 MG/DL — SIGNIFICANT CHANGE UP (ref 1.6–2.6)
MCHC RBC-ENTMCNC: 31.3 PG — SIGNIFICANT CHANGE UP (ref 27–34)
MCHC RBC-ENTMCNC: 34.6 G/DL — SIGNIFICANT CHANGE UP (ref 32–36)
MCV RBC AUTO: 90.4 FL — SIGNIFICANT CHANGE UP (ref 80–100)
NONHDLC SERPL-MCNC: 89 MG/DL — SIGNIFICANT CHANGE UP
NRBC BLD AUTO-RTO: 0 /100 WBCS — SIGNIFICANT CHANGE UP (ref 0–0)
PHOSPHATE SERPL-MCNC: 3.1 MG/DL — SIGNIFICANT CHANGE UP (ref 2.5–4.5)
PLATELET # BLD AUTO: 203 K/UL — SIGNIFICANT CHANGE UP (ref 150–400)
POTASSIUM SERPL-MCNC: 3.5 MMOL/L — SIGNIFICANT CHANGE UP (ref 3.5–5.3)
POTASSIUM SERPL-SCNC: 3.5 MMOL/L — SIGNIFICANT CHANGE UP (ref 3.5–5.3)
RBC # BLD: 4.28 M/UL — SIGNIFICANT CHANGE UP (ref 4.2–5.8)
RBC # FLD: 12.5 % — SIGNIFICANT CHANGE UP (ref 10.3–14.5)
SODIUM SERPL-SCNC: 138 MMOL/L — SIGNIFICANT CHANGE UP (ref 135–145)
TRIGL SERPL-MCNC: 147 MG/DL — SIGNIFICANT CHANGE UP
TSH SERPL-MCNC: 2.42 UU/ML — SIGNIFICANT CHANGE UP (ref 0.34–4.82)
WBC # BLD: 8.73 K/UL — SIGNIFICANT CHANGE UP (ref 3.8–10.5)
WBC # FLD AUTO: 8.73 K/UL — SIGNIFICANT CHANGE UP (ref 3.8–10.5)

## 2025-07-14 PROCEDURE — 71250 CT THORAX DX C-: CPT | Mod: 26

## 2025-07-14 RX ADMIN — HEPARIN SODIUM 5000 UNIT(S): 1000 INJECTION INTRAVENOUS; SUBCUTANEOUS at 17:51

## 2025-07-14 RX ADMIN — CEFTRIAXONE 100 MILLIGRAM(S): 500 INJECTION, POWDER, FOR SOLUTION INTRAMUSCULAR; INTRAVENOUS at 05:44

## 2025-07-14 RX ADMIN — INSULIN GLARGINE-YFGN 13 UNIT(S): 100 INJECTION, SOLUTION SUBCUTANEOUS at 21:13

## 2025-07-14 RX ADMIN — HEPARIN SODIUM 5000 UNIT(S): 1000 INJECTION INTRAVENOUS; SUBCUTANEOUS at 05:46

## 2025-07-14 NOTE — PROGRESS NOTE ADULT - PROBLEM SELECTOR PLAN 1
Panculture  Antibiotics  Monitor Temp and WBC  CXR noted  CT chest w/o contrast  ID Consult. Cultures noted  Antibiotics  Monitor Temp and WBC  CXR noted  CT chest w/o contrast  ID Consult.

## 2025-07-14 NOTE — DIETITIAN INITIAL EVALUATION ADULT - NS FNS DIET ORDER
Diet, DASH/TLC:   Sodium & Cholesterol Restricted  Consistent Carbohydrate {No Snacks} (07-12-25 @ 18:17) [Active]

## 2025-07-14 NOTE — DIETITIAN INITIAL EVALUATION ADULT - ETIOLOGY
Altered ition related labs evidenced by high Glu. POCT Glu and A1C related to non compliance with taking meds for DM resulting in hyperglycemia.

## 2025-07-14 NOTE — PROGRESS NOTE ADULT - SUBJECTIVE AND OBJECTIVE BOX
Date of Service 07-14-25 @ 08:49    CHIEF COMPLAINT:Patient is a 80y old  Male who presents with a chief complaint of Syncope .Pt appears comfortable.    	  REVIEW OF SYSTEMS:  CONSTITUTIONAL: No fever, weight loss, or fatigue  EYES: No eye pain, visual disturbances, or discharge  ENT:  No difficulty hearing, tinnitus, vertigo; No sinus or throat pain  NECK: No pain or stiffness  RESPIRATORY: No cough, wheezing, chills or hemoptysis; No Shortness of Breath  CARDIOVASCULAR: No chest pain, palpitations, passing out, dizziness, or leg swelling  GASTROINTESTINAL: No abdominal or epigastric pain. No nausea, vomiting, or hematemesis; No diarrhea or constipation. No melena or hematochezia.  GENITOURINARY: No dysuria, frequency, hematuria, or incontinence  NEUROLOGICAL: No headaches, memory loss, loss of strength, numbness, or tremors  SKIN: No itching, burning, rashes, or lesions   LYMPH Nodes: No enlarged glands  ENDOCRINE: No heat or cold intolerance; No hair loss  MUSCULOSKELETAL: No joint pain or swelling; No muscle, back, or extremity pain  PSYCHIATRIC: No depression, anxiety, mood swings, or difficulty sleeping  HEME/LYMPH: No easy bruising, or bleeding gums  ALLERGY AND IMMUNOLOGIC: No hives or eczema	        PHYSICAL EXAM:  T(C): 36.3 (07-14-25 @ 07:41), Max: 37.1 (07-13-25 @ 23:14)  HR: 77 (07-14-25 @ 07:41) (73 - 102)  BP: 133/89 (07-14-25 @ 07:41) (111/68 - 151/87)  RR: 19 (07-14-25 @ 07:41) (17 - 19)  SpO2: 95% (07-14-25 @ 07:41) (94% - 99%)  Wt(kg): --  I&O's Summary    13 Jul 2025 07:01  -  14 Jul 2025 07:00  --------------------------------------------------------  IN: 0 mL / OUT: 700 mL / NET: -700 mL        Appearance: Normal	  HEENT:   Normal oral mucosa, PERRL, EOMI	  Lymphatic: No lymphadenopathy  Cardiovascular: Normal S1 S2, No JVD, No murmurs, No edema  Respiratory: Lungs clear to auscultation	  Psychiatry: A & O x 3, Mood & affect appropriate  Gastrointestinal:  Soft, Non-tender, + BS	  Skin: No rashes, No ecchymoses, No cyanosis	  Neurologic: Non-focal  Extremities: Normal range of motion, No clubbing, cyanosis or edema  Vascular: Peripheral pulses palpable 2+ bilaterally    MEDICATIONS  (STANDING):  cefTRIAXone   IVPB 1000 milliGRAM(s) IV Intermittent every 24 hours  dextrose 5%. 1000 milliLiter(s) (50 mL/Hr) IV Continuous <Continuous>  dextrose 5%. 1000 milliLiter(s) (100 mL/Hr) IV Continuous <Continuous>  dextrose 50% Injectable 25 Gram(s) IV Push once  dextrose 50% Injectable 12.5 Gram(s) IV Push once  dextrose 50% Injectable 25 Gram(s) IV Push once  glucagon  Injectable 1 milliGRAM(s) IntraMuscular once  heparin   Injectable 5000 Unit(s) SubCutaneous every 12 hours  insulin glargine Injectable (LANTUS) 13 Unit(s) SubCutaneous at bedtime  insulin lispro (ADMELOG) corrective regimen sliding scale   SubCutaneous three times a day before meals  insulin lispro Injectable (ADMELOG) 4 Unit(s) SubCutaneous three times a day before meals      TELEMETRY: nsr,sinus tach	      LABS:	 	      Troponin I, High Sensitivity Result: 16.6 ng/L (07-12 @ 12:20)                            13.4   8.73  )-----------( 203      ( 14 Jul 2025 05:13 )             38.7     07-14    138  |  104  |  15  ----------------------------<  193[H]  3.5   |  27  |  1.02    Ca    8.8      14 Jul 2025 05:13  Phos  3.1     07-14  Mg     1.7     07-14    TPro  7.2  /  Alb  4.1  /  TBili  0.7  /  DBili  x   /  AST  19  /  ALT  23  /  AlkPhos  84  07-12      Lipid Profile: Cholesterol 134  LDL --  HDL 45    Ldl calc 64    TSH: Thyroid Stimulating Hormone, Serum: 2.42 uU/mL (07-14 @ 05:13)      Doppler-no dvt.

## 2025-07-14 NOTE — PROGRESS NOTE ADULT - SUBJECTIVE AND OBJECTIVE BOX
NP Note :      Patient is a 80y old  Male who presents with a chief complaint of Syncope (14 Jul 2025 11:15)      INTERVAL HPI / OVERNIGHT EVENTS: no new complaints    MEDICATIONS  (STANDING):  cefTRIAXone   IVPB 1000 milliGRAM(s) IV Intermittent every 24 hours  dextrose 5%. 1000 milliLiter(s) (50 mL/Hr) IV Continuous <Continuous>  dextrose 5%. 1000 milliLiter(s) (100 mL/Hr) IV Continuous <Continuous>  dextrose 50% Injectable 25 Gram(s) IV Push once  dextrose 50% Injectable 12.5 Gram(s) IV Push once  dextrose 50% Injectable 25 Gram(s) IV Push once  glucagon  Injectable 1 milliGRAM(s) IntraMuscular once  heparin   Injectable 5000 Unit(s) SubCutaneous every 12 hours  insulin glargine Injectable (LANTUS) 13 Unit(s) SubCutaneous at bedtime  insulin lispro (ADMELOG) corrective regimen sliding scale   SubCutaneous three times a day before meals  insulin lispro Injectable (ADMELOG) 4 Unit(s) SubCutaneous three times a day before meals    MEDICATIONS  (PRN):  acetaminophen     Tablet .. 650 milliGRAM(s) Oral every 6 hours PRN Temp greater or equal to 38C (100.4F), Mild Pain (1 - 3)  aluminum hydroxide/magnesium hydroxide/simethicone Suspension 30 milliLiter(s) Oral every 4 hours PRN Dyspepsia  dextrose Oral Gel 15 Gram(s) Oral once PRN Blood Glucose LESS THAN 70 milliGRAM(s)/deciliter  ondansetron Injectable 4 milliGRAM(s) IV Push every 8 hours PRN Nausea and/or Vomiting      __________________________________________________  REVIEW OF SYSTEMS:    CONSTITUTIONAL: No fever, chills  EYES: no acute visual disturbances  NECK: No pain or stiffness  RESPIRATORY: No cough; No shortness of breath  CARDIOVASCULAR: No chest pain, no palpitations  GASTROINTESTINAL: No pain. No nausea or vomiting; No diarrhea   NEUROLOGICAL: No headache or numbness, no tremors  MUSCULOSKELETAL: No joint pain, no muscle pain  GENITOURINARY: no dysuria, no frequency, no hesitancy  PSYCHIATRY: no depression , no anxiety  ALL OTHER  ROS negative        Vital Signs Last 24 Hrs  T(C): 36.6 (14 Jul 2025 15:01), Max: 37.1 (13 Jul 2025 23:14)  T(F): 97.9 (14 Jul 2025 15:01), Max: 98.7 (13 Jul 2025 23:14)  HR: 75 (14 Jul 2025 15:01) (73 - 97)  BP: 144/93 (14 Jul 2025 15:01) (130/91 - 151/87)  BP(mean): --  RR: 19 (14 Jul 2025 15:01) (17 - 19)  SpO2: 95% (14 Jul 2025 15:01) (94% - 99%)    Parameters below as of 14 Jul 2025 15:01  Patient On (Oxygen Delivery Method): room air        ________________________________________________  PHYSICAL EXAM:  GENERAL: No acute distress  HEENT: Normocephalic;  conjunctivae and sclerae clear; moist mucous membranes;   NECK : supple  CHEST/LUNG: Clear to auscultation bilaterally with good air entry   HEART: S1 S2  regular; no murmurs, gallops or rubs  ABDOMEN: Soft, Nontender, Nondistended; Bowel sounds present  EXTREMITIES: no cyanosis; no edema; no calf tenderness  SKIN: warm and dry; no rash  NERVOUS SYSTEM:  Awake and alert; Oriented  to place, person and time ; no new deficits    _________________________________________________      LABS:                        13.4   8.73  )-----------( 203      ( 14 Jul 2025 05:13 )             38.7     07-14    138  |  104  |  15  ----------------------------<  193[H]  3.5   |  27  |  1.02    Ca    8.8      14 Jul 2025 05:13  Phos  3.1     07-14  Mg     1.7     07-14        Urinalysis Basic - ( 14 Jul 2025 05:13 )    Color: x / Appearance: x / SG: x / pH: x  Gluc: 193 mg/dL / Ketone: x  / Bili: x / Urobili: x   Blood: x / Protein: x / Nitrite: x   Leuk Esterase: x / RBC: x / WBC x   Sq Epi: x / Non Sq Epi: x / Bacteria: x      CAPILLARY BLOOD GLUCOSE  POCT Blood Glucose.: 243 mg/dL (14 Jul 2025 07:59)  POCT Blood Glucose.: 158 mg/dL (13 Jul 2025 21:11)  POCT Blood Glucose.: 165 mg/dL (13 Jul 2025 16:34)        RADIOLOGY & ADDITIONAL TESTS:    Imaging  Reviewed:  YES/NO    Consultant(s) Notes Reviewed:   YES/ No      Plan of care was discussed with patient and /or primary care giver; all questions and concerns were addressed  NP Note :      Patient is a 80y old  Male who presents with a chief complaint of Syncope (14 Jul 2025 11:15)      INTERVAL HPI / OVERNIGHT EVENTS: no new complaints    MEDICATIONS  (STANDING):  cefTRIAXone   IVPB 1000 milliGRAM(s) IV Intermittent every 24 hours  dextrose 5%. 1000 milliLiter(s) (50 mL/Hr) IV Continuous <Continuous>  dextrose 5%. 1000 milliLiter(s) (100 mL/Hr) IV Continuous <Continuous>  dextrose 50% Injectable 25 Gram(s) IV Push once  dextrose 50% Injectable 12.5 Gram(s) IV Push once  dextrose 50% Injectable 25 Gram(s) IV Push once  glucagon  Injectable 1 milliGRAM(s) IntraMuscular once  heparin   Injectable 5000 Unit(s) SubCutaneous every 12 hours  insulin glargine Injectable (LANTUS) 13 Unit(s) SubCutaneous at bedtime  insulin lispro (ADMELOG) corrective regimen sliding scale   SubCutaneous three times a day before meals  insulin lispro Injectable (ADMELOG) 4 Unit(s) SubCutaneous three times a day before meals    MEDICATIONS  (PRN):  acetaminophen     Tablet .. 650 milliGRAM(s) Oral every 6 hours PRN Temp greater or equal to 38C (100.4F), Mild Pain (1 - 3)  aluminum hydroxide/magnesium hydroxide/simethicone Suspension 30 milliLiter(s) Oral every 4 hours PRN Dyspepsia  dextrose Oral Gel 15 Gram(s) Oral once PRN Blood Glucose LESS THAN 70 milliGRAM(s)/deciliter  ondansetron Injectable 4 milliGRAM(s) IV Push every 8 hours PRN Nausea and/or Vomiting      __________________________________________________  REVIEW OF SYSTEMS:  Unable to do full ROS,  patient disoriented at times  Denies any pain, SOB    Vital Signs Last 24 Hrs  T(C): 36.6 (14 Jul 2025 15:01), Max: 37.1 (13 Jul 2025 23:14)  T(F): 97.9 (14 Jul 2025 15:01), Max: 98.7 (13 Jul 2025 23:14)  HR: 75 (14 Jul 2025 15:01) (73 - 97)  BP: 144/93 (14 Jul 2025 15:01) (130/91 - 151/87)  BP(mean): --  RR: 19 (14 Jul 2025 15:01) (17 - 19)  SpO2: 95% (14 Jul 2025 15:01) (94% - 99%)    Parameters below as of 14 Jul 2025 15:01  Patient On (Oxygen Delivery Method): room air        ________________________________________________  PHYSICAL EXAM:  GENERAL: No acute distress  HEENT: Normocephalic;  conjunctivae and sclerae clear; moist mucous membranes;   NECK : supple  CHEST/LUNG: Clear to auscultation bilaterally with good air entry   HEART: S1 S2  regular; no murmurs, gallops or rubs  ABDOMEN: Soft, Nontender, Nondistended; Bowel sounds present  EXTREMITIES: no cyanosis; no edema; no calf tenderness.  lower extremities contracted in bed,  SKIN: warm and dry; no rash  NERVOUS SYSTEM:  Awake and alert; Oriented  to place, person and time ; no new deficits  Barth catheter in place  _________________________________________________      LABS:                        13.4   8.73  )-----------( 203      ( 14 Jul 2025 05:13 )             38.7     07-14    138  |  104  |  15  ----------------------------<  193[H]  3.5   |  27  |  1.02    Ca    8.8      14 Jul 2025 05:13  Phos  3.1     07-14  Mg     1.7     07-14        Urinalysis Basic - ( 14 Jul 2025 05:13 )    Color: x / Appearance: x / SG: x / pH: x  Gluc: 193 mg/dL / Ketone: x  / Bili: x / Urobili: x   Blood: x / Protein: x / Nitrite: x   Leuk Esterase: x / RBC: x / WBC x   Sq Epi: x / Non Sq Epi: x / Bacteria: x      CAPILLARY BLOOD GLUCOSE  POCT Blood Glucose.: 243 mg/dL (14 Jul 2025 07:59)  POCT Blood Glucose.: 158 mg/dL (13 Jul 2025 21:11)  POCT Blood Glucose.: 165 mg/dL (13 Jul 2025 16:34)        RADIOLOGY & ADDITIONAL TESTS:    Imaging  Reviewed:  YES/NO    Consultant(s) Notes Reviewed:   YES/ No      Plan of care was discussed with patient and /or primary care giver; all questions and concerns were addressed

## 2025-07-14 NOTE — PROGRESS NOTE ADULT - PROBLEM SELECTOR PLAN 1
-  Patient reports fall in shower AM, felt light headed then fell & hit his head  -  BIBEMS for fall, hit head   -  Denies LOC, loss of control of bowel  and bladder  -  EKG: NSR, No TWI, Ischemia or Infarction  -  UA neg for infection  -  CTH: Volume loss and chronic microvascular ischemic changes. CT CERVICAL SPINE: No fracture. High-grade bilateral C5-C6 and C6-C7. Foraminal stenosis due to advanced disc and uncovertebral joint degeneration.  Admitted to Tele for pre-sycnope w/ head strike  -  PT consulted  -  TTE pending  -  Cardiology  consulted: Dr. Purvis

## 2025-07-14 NOTE — PROGRESS NOTE ADULT - PROBLEM SELECTOR PLAN 8
Improved  IVF  Avoid nephrotoxins  Monitor BMP  Renal eval. Improved  IVF  Avoid nephrotoxins  Monitor BMP

## 2025-07-14 NOTE — DIETITIAN INITIAL EVALUATION ADULT - PERTINENT LABORATORY DATA
07-14    138  |  104  |  15  ----------------------------<  193[H]  3.5   |  27  |  1.02    Ca    8.8      14 Jul 2025 05:13  Phos  3.1     07-14  Mg     1.7     07-14    TPro  7.2  /  Alb  4.1  /  TBili  0.7  /  DBili  x   /  AST  19  /  ALT  23  /  AlkPhos  84  07-12  POCT Blood Glucose.: 243 mg/dL (07-14-25 @ 07:59)  A1C with Estimated Average Glucose Result: 12.2 % (07-13-25 @ 07:35)

## 2025-07-14 NOTE — PROGRESS NOTE ADULT - PROBLEM SELECTOR PLAN 2
-   Patient presented abdominal pain   -   PE significant for lower abdominal mass consistent w/ bladder   -   Bladder scan: significant for >700mL  -   Barth placed  -   Trial of void in AM

## 2025-07-14 NOTE — DIETITIAN INITIAL EVALUATION ADULT - PERTINENT MEDS FT
MEDICATIONS  (STANDING):  cefTRIAXone   IVPB 1000 milliGRAM(s) IV Intermittent every 24 hours  dextrose 5%. 1000 milliLiter(s) (50 mL/Hr) IV Continuous <Continuous>  dextrose 5%. 1000 milliLiter(s) (100 mL/Hr) IV Continuous <Continuous>  dextrose 50% Injectable 25 Gram(s) IV Push once  dextrose 50% Injectable 12.5 Gram(s) IV Push once  dextrose 50% Injectable 25 Gram(s) IV Push once  glucagon  Injectable 1 milliGRAM(s) IntraMuscular once  heparin   Injectable 5000 Unit(s) SubCutaneous every 12 hours  insulin glargine Injectable (LANTUS) 13 Unit(s) SubCutaneous at bedtime  insulin lispro (ADMELOG) corrective regimen sliding scale   SubCutaneous three times a day before meals  insulin lispro Injectable (ADMELOG) 4 Unit(s) SubCutaneous three times a day before meals    MEDICATIONS  (PRN):  acetaminophen     Tablet .. 650 milliGRAM(s) Oral every 6 hours PRN Temp greater or equal to 38C (100.4F), Mild Pain (1 - 3)  aluminum hydroxide/magnesium hydroxide/simethicone Suspension 30 milliLiter(s) Oral every 4 hours PRN Dyspepsia  dextrose Oral Gel 15 Gram(s) Oral once PRN Blood Glucose LESS THAN 70 milliGRAM(s)/deciliter  ondansetron Injectable 4 milliGRAM(s) IV Push every 8 hours PRN Nausea and/or Vomiting

## 2025-07-14 NOTE — DIETITIAN INITIAL EVALUATION ADULT - ORAL INTAKE PTA/DIET HISTORY
Patient was admitted from home. He was not in the room at the time of the visit. His wife provided some information. EMR was reviewed, HIE indicates that he has gained weight. His wife said that he sometimes will take his medications. She tries to convince him but it is difficult.

## 2025-07-14 NOTE — DIETITIAN INITIAL EVALUATION ADULT - LITERATURE/VIDEOS GIVEN
Patient was not in room at time of visit, provided copy of "Plate Method for Diabetes" in [place]ian printed from the [facility] and given to patient's wife.

## 2025-07-14 NOTE — PROGRESS NOTE ADULT - SUBJECTIVE AND OBJECTIVE BOX
pt seen in icu [  ], reg med floor [ x  ], bed [  ], chair at bedside [   ]    REVIEW OF SYSTEMS:    CONSTITUTIONAL: No weakness, fevers or chills  EYES/ENT: No visual changes;  No vertigo or throat pain   NECK: No pain or stiffness  RESPIRATORY: No cough, wheezing, hemoptysis; No shortness of breath  CARDIOVASCULAR: No chest pain or palpitations  GASTROINTESTINAL: No abdominal or epigastric pain. No nausea, vomiting, or hematemesis; No diarrhea or constipation. No melena or hematochezia.  GENITOURINARY: No dysuria, frequency or hematuria  NEUROLOGICAL: No numbness or weakness  SKIN: No itching, burning, rashes, or lesions   All other review of systems is negative unless indicated above.    Physical Exam    General: WN/WD NAD  Neurology: A&Ox3, nonfocal, BAILON x 4  Respiratory: CTA B/L  CV: RRR, S1S2, no murmurs, rubs or gallops  Abdominal: Soft, NT, ND +BS, Last BM  Extremities: No edema, + peripheral pulses      Allergies  No Known Allergies      Health Issues  Sepsis    Diabetes mellitus    Hypertension    Hyperlipidemia    No significant past surgical history        Vitals  T(F): 97.3 (07-14-25 @ 07:41), Max: 98.7 (07-13-25 @ 23:14)  HR: 77 (07-14-25 @ 07:41) (73 - 102)  BP: 133/89 (07-14-25 @ 07:41) (111/68 - 151/87)  RR: 19 (07-14-25 @ 07:41) (17 - 19)  SpO2: 95% (07-14-25 @ 07:41) (94% - 99%)  Wt(kg): --  CAPILLARY BLOOD GLUCOSE      POCT Blood Glucose.: 243 mg/dL (14 Jul 2025 07:59)      Labs                          13.4   8.73  )-----------( 203      ( 14 Jul 2025 05:13 )             38.7       07-14    138  |  104  |  15  ----------------------------<  193[H]  3.5   |  27  |  1.02    Ca    8.8      14 Jul 2025 05:13  Phos  3.1     07-14  Mg     1.7     07-14    TPro  7.2  /  Alb  4.1  /  TBili  0.7  /  DBili  x   /  AST  19  /  ALT  23  /  AlkPhos  84  07-12        Lipid Profile in AM (07.14.25 @ 05:13)   Cholesterol: 134 mg/dL  Triglycerides, Serum: 147 mg/dL  HDL Cholesterol: 45 mg/dL  Non HDL Cholesterol: 89  Lactate, Blood: 1.3 mmol/L  Culture - Blood (07.12.25 @ 13:15)   Specimen Source: Blood Blood-Peripheral  Culture Results:   No growth at 24 hours    Culture - Blood (07.12.25 @ 13:10)   Specimen Source: Blood Blood-Peripheral  Culture Results:   No growth at 24 hours        Radiology Results  < from: US Duplex Venous Lower Ext Complete, Bilateral (07.13.25 @ 13:15) >  IMPRESSION:  No evidence of deep venous thrombosis in either lower extremity.    Evaluation of the calf veins is limited on this examination. If isolated   calf vein thrombus is of clinical concern, short interval follow-up   ultrasound to reevaluate is recommended.      < end of copied text >    < from: Xray Chest 1 View- PORTABLE-Urgent (Xray Chest 1 View- PORTABLE-Urgent .) (07.12.25 @ 14:49) >  IMPRESSION: Possible small left-sided effusion/atelectasis. Cardiomegaly.   Consider chest CT.      < end of copied text >      Meds    MEDICATIONS  (STANDING):  cefTRIAXone   IVPB 1000 milliGRAM(s) IV Intermittent every 24 hours  dextrose 5%. 1000 milliLiter(s) (50 mL/Hr) IV Continuous <Continuous>  dextrose 5%. 1000 milliLiter(s) (100 mL/Hr) IV Continuous <Continuous>  dextrose 50% Injectable 25 Gram(s) IV Push once  dextrose 50% Injectable 12.5 Gram(s) IV Push once  dextrose 50% Injectable 25 Gram(s) IV Push once  glucagon  Injectable 1 milliGRAM(s) IntraMuscular once  heparin   Injectable 5000 Unit(s) SubCutaneous every 12 hours  insulin glargine Injectable (LANTUS) 13 Unit(s) SubCutaneous at bedtime  insulin lispro (ADMELOG) corrective regimen sliding scale   SubCutaneous three times a day before meals  insulin lispro Injectable (ADMELOG) 4 Unit(s) SubCutaneous three times a day before meals      MEDICATIONS  (PRN):  acetaminophen     Tablet .. 650 milliGRAM(s) Oral every 6 hours PRN Temp greater or equal to 38C (100.4F), Mild Pain (1 - 3)  aluminum hydroxide/magnesium hydroxide/simethicone Suspension 30 milliLiter(s) Oral every 4 hours PRN Dyspepsia  dextrose Oral Gel 15 Gram(s) Oral once PRN Blood Glucose LESS THAN 70 milliGRAM(s)/deciliter  ondansetron Injectable 4 milliGRAM(s) IV Push every 8 hours PRN Nausea and/or Vomiting         pt seen in icu [  ], reg med floor [ x  ], bed [  ], chair at bedside [   ]  Awake, alert comfortable in bed in NAD. For stress test today  REVIEW OF SYSTEMS:    CONSTITUTIONAL: No weakness, fevers or chills  EYES/ENT: No visual changes;  No vertigo or throat pain   NECK: No pain or stiffness  RESPIRATORY: No cough, wheezing, hemoptysis; No shortness of breath  CARDIOVASCULAR: No chest pain or palpitations  GASTROINTESTINAL: No abdominal or epigastric pain. No nausea, vomiting, or hematemesis; No diarrhea or constipation. No melena or hematochezia.  GENITOURINARY: No dysuria, frequency or hematuria  NEUROLOGICAL: No numbness or weakness  SKIN: No itching, burning, rashes, or lesions   All other review of systems is negative unless indicated above.    Physical Exam    General: WN/WD NAD  Neurology: A&Ox3, nonfocal, BAILON x 4  Respiratory: CTA B/L  CV: RRR, S1S2, no murmurs, rubs or gallops  Abdominal: Soft, NT, ND +BS, Last BM  Extremities: No edema, + peripheral pulses      Allergies  No Known Allergies      Health Issues  Sepsis    Diabetes mellitus    Hypertension    Hyperlipidemia    No significant past surgical history        Vitals  T(F): 97.3 (07-14-25 @ 07:41), Max: 98.7 (07-13-25 @ 23:14)  HR: 77 (07-14-25 @ 07:41) (73 - 102)  BP: 133/89 (07-14-25 @ 07:41) (111/68 - 151/87)  RR: 19 (07-14-25 @ 07:41) (17 - 19)  SpO2: 95% (07-14-25 @ 07:41) (94% - 99%)  Wt(kg): --  CAPILLARY BLOOD GLUCOSE      POCT Blood Glucose.: 243 mg/dL (14 Jul 2025 07:59)      Labs                          13.4   8.73  )-----------( 203      ( 14 Jul 2025 05:13 )             38.7       07-14    138  |  104  |  15  ----------------------------<  193[H]  3.5   |  27  |  1.02    Ca    8.8      14 Jul 2025 05:13  Phos  3.1     07-14  Mg     1.7     07-14    TPro  7.2  /  Alb  4.1  /  TBili  0.7  /  DBili  x   /  AST  19  /  ALT  23  /  AlkPhos  84  07-12        Lipid Profile in AM (07.14.25 @ 05:13)   Cholesterol: 134 mg/dL  Triglycerides, Serum: 147 mg/dL  HDL Cholesterol: 45 mg/dL  Non HDL Cholesterol: 89  Lactate, Blood: 1.3 mmol/L  Culture - Blood (07.12.25 @ 13:15)   Specimen Source: Blood Blood-Peripheral  Culture Results:   No growth at 24 hours    Culture - Blood (07.12.25 @ 13:10)   Specimen Source: Blood Blood-Peripheral  Culture Results:   No growth at 24 hours        Radiology Results  < from: US Duplex Venous Lower Ext Complete, Bilateral (07.13.25 @ 13:15) >  IMPRESSION:  No evidence of deep venous thrombosis in either lower extremity.    Evaluation of the calf veins is limited on this examination. If isolated   calf vein thrombus is of clinical concern, short interval follow-up   ultrasound to reevaluate is recommended.      < end of copied text >    < from: Xray Chest 1 View- PORTABLE-Urgent (Xray Chest 1 View- PORTABLE-Urgent .) (07.12.25 @ 14:49) >  IMPRESSION: Possible small left-sided effusion/atelectasis. Cardiomegaly.   Consider chest CT.      < end of copied text >      Meds    MEDICATIONS  (STANDING):  cefTRIAXone   IVPB 1000 milliGRAM(s) IV Intermittent every 24 hours  dextrose 5%. 1000 milliLiter(s) (50 mL/Hr) IV Continuous <Continuous>  dextrose 5%. 1000 milliLiter(s) (100 mL/Hr) IV Continuous <Continuous>  dextrose 50% Injectable 25 Gram(s) IV Push once  dextrose 50% Injectable 12.5 Gram(s) IV Push once  dextrose 50% Injectable 25 Gram(s) IV Push once  glucagon  Injectable 1 milliGRAM(s) IntraMuscular once  heparin   Injectable 5000 Unit(s) SubCutaneous every 12 hours  insulin glargine Injectable (LANTUS) 13 Unit(s) SubCutaneous at bedtime  insulin lispro (ADMELOG) corrective regimen sliding scale   SubCutaneous three times a day before meals  insulin lispro Injectable (ADMELOG) 4 Unit(s) SubCutaneous three times a day before meals      MEDICATIONS  (PRN):  acetaminophen     Tablet .. 650 milliGRAM(s) Oral every 6 hours PRN Temp greater or equal to 38C (100.4F), Mild Pain (1 - 3)  aluminum hydroxide/magnesium hydroxide/simethicone Suspension 30 milliLiter(s) Oral every 4 hours PRN Dyspepsia  dextrose Oral Gel 15 Gram(s) Oral once PRN Blood Glucose LESS THAN 70 milliGRAM(s)/deciliter  ondansetron Injectable 4 milliGRAM(s) IV Push every 8 hours PRN Nausea and/or Vomiting

## 2025-07-15 DIAGNOSIS — Z75.8 OTHER PROBLEMS RELATED TO MEDICAL FACILITIES AND OTHER HEALTH CARE: ICD-10-CM

## 2025-07-15 LAB
GLUCOSE BLDC GLUCOMTR-MCNC: 181 MG/DL — HIGH (ref 70–99)
GLUCOSE BLDC GLUCOMTR-MCNC: 195 MG/DL — HIGH (ref 70–99)
GLUCOSE BLDC GLUCOMTR-MCNC: 241 MG/DL — HIGH (ref 70–99)
GLUCOSE BLDC GLUCOMTR-MCNC: 315 MG/DL — HIGH (ref 70–99)

## 2025-07-15 RX ORDER — INSULIN LISPRO 100 U/ML
6 INJECTION, SOLUTION INTRAVENOUS; SUBCUTANEOUS
Refills: 0 | Status: DISCONTINUED | OUTPATIENT
Start: 2025-07-15 | End: 2025-07-17

## 2025-07-15 RX ADMIN — HEPARIN SODIUM 5000 UNIT(S): 1000 INJECTION INTRAVENOUS; SUBCUTANEOUS at 05:16

## 2025-07-15 RX ADMIN — CEFTRIAXONE 100 MILLIGRAM(S): 500 INJECTION, POWDER, FOR SOLUTION INTRAMUSCULAR; INTRAVENOUS at 05:17

## 2025-07-15 RX ADMIN — HEPARIN SODIUM 5000 UNIT(S): 1000 INJECTION INTRAVENOUS; SUBCUTANEOUS at 17:34

## 2025-07-15 NOTE — PROGRESS NOTE ADULT - PROBLEM SELECTOR PLAN 1
-  Patient reports fall in shower AM, felt light headed then fell & hit his head  -  BIBEMS for fall, hit head   -  Denies LOC, loss of control of bowel  and bladder  -  EKG: NSR, No TWI, Ischemia or Infarction  -  UA neg for infection  -  CTH: Volume loss and chronic microvascular ischemic changes. CT CERVICAL SPINE: No fracture. High-grade bilateral C5-C6 and C6-C7. Foraminal stenosis due to advanced disc and uncovertebral joint degeneration.  Admitted to Tele for pre-sycnope w/ head strike  -  PT recs PAVAN  -  TTE no acoustic windows  -  Cardiology  consulted: Dr. Purvis Brain/Spinal implant/Lens implant

## 2025-07-15 NOTE — PROGRESS NOTE ADULT - SUBJECTIVE AND OBJECTIVE BOX
NP Note discussed with  primary attending    Patient is a 80y old  Male who presents with a chief complaint of Fall (15 Jul 2025 10:22)      INTERVAL HPI/OVERNIGHT EVENTS: no new complaints    MEDICATIONS  (STANDING):  dextrose 5%. 1000 milliLiter(s) (50 mL/Hr) IV Continuous <Continuous>  dextrose 5%. 1000 milliLiter(s) (100 mL/Hr) IV Continuous <Continuous>  dextrose 50% Injectable 25 Gram(s) IV Push once  dextrose 50% Injectable 12.5 Gram(s) IV Push once  dextrose 50% Injectable 25 Gram(s) IV Push once  glucagon  Injectable 1 milliGRAM(s) IntraMuscular once  heparin   Injectable 5000 Unit(s) SubCutaneous every 12 hours  insulin glargine Injectable (LANTUS) 13 Unit(s) SubCutaneous at bedtime  insulin lispro (ADMELOG) corrective regimen sliding scale   SubCutaneous three times a day before meals  insulin lispro Injectable (ADMELOG) 6 Unit(s) SubCutaneous three times a day before meals    MEDICATIONS  (PRN):  acetaminophen     Tablet .. 650 milliGRAM(s) Oral every 6 hours PRN Temp greater or equal to 38C (100.4F), Mild Pain (1 - 3)  aluminum hydroxide/magnesium hydroxide/simethicone Suspension 30 milliLiter(s) Oral every 4 hours PRN Dyspepsia  dextrose Oral Gel 15 Gram(s) Oral once PRN Blood Glucose LESS THAN 70 milliGRAM(s)/deciliter  ondansetron Injectable 4 milliGRAM(s) IV Push every 8 hours PRN Nausea and/or Vomiting      __________________________________________________  REVIEW OF SYSTEMS:    CONSTITUTIONAL: No fever,   EYES: no acute visual disturbances  NECK: No pain or stiffness  RESPIRATORY: No cough; No shortness of breath  CARDIOVASCULAR: No chest pain, no palpitations  GASTROINTESTINAL: No pain. No nausea or vomiting; No diarrhea   NEUROLOGICAL: No headache or numbness, no tremors  MUSCULOSKELETAL: No joint pain, no muscle pain  GENITOURINARY: no dysuria, no frequency, no hesitancy  PSYCHIATRY: no depression , no anxiety  ALL OTHER  ROS negative        Vital Signs Last 24 Hrs  T(C): 36.2 (15 Jul 2025 10:51), Max: 36.6 (14 Jul 2025 16:03)  T(F): 97.2 (15 Jul 2025 10:51), Max: 97.9 (14 Jul 2025 16:03)  HR: 87 (15 Jul 2025 10:51) (78 - 96)  BP: 110/83 (15 Jul 2025 10:51) (110/83 - 141/92)  BP(mean): --  RR: 19 (15 Jul 2025 10:51) (18 - 19)  SpO2: 96% (15 Jul 2025 10:51) (92% - 96%)    Parameters below as of 15 Jul 2025 10:51  Patient On (Oxygen Delivery Method): room air        ________________________________________________  PHYSICAL EXAM:  GENERAL: NAD  HEENT: Normocephalic;  conjunctivae and sclerae clear; moist mucous membranes;   NECK : supple  CHEST/LUNG: Clear to ausculitation bilaterally with good air entry   HEART: S1 S2  regular; no murmurs, gallops or rubs  ABDOMEN: Soft, Nontender, Nondistended; Bowel sounds present  EXTREMITIES: no cyanosis; no edema; no calf tenderness  SKIN: warm and dry; no rash  NERVOUS SYSTEM:  Awake and alert; Oriented  to place, person and time ; no new deficits    _________________________________________________  LABS:                        13.4   8.73  )-----------( 203      ( 14 Jul 2025 05:13 )             38.7     07-14    138  |  104  |  15  ----------------------------<  193[H]  3.5   |  27  |  1.02    Ca    8.8      14 Jul 2025 05:13  Phos  3.1     07-14  Mg     1.7     07-14        Urinalysis Basic - ( 14 Jul 2025 05:13 )    Color: x / Appearance: x / SG: x / pH: x  Gluc: 193 mg/dL / Ketone: x  / Bili: x / Urobili: x   Blood: x / Protein: x / Nitrite: x   Leuk Esterase: x / RBC: x / WBC x   Sq Epi: x / Non Sq Epi: x / Bacteria: x      CAPILLARY BLOOD GLUCOSE      POCT Blood Glucose.: 315 mg/dL (15 Jul 2025 11:20)  POCT Blood Glucose.: 181 mg/dL (15 Jul 2025 07:45)  POCT Blood Glucose.: 240 mg/dL (14 Jul 2025 21:04)  POCT Blood Glucose.: 322 mg/dL (14 Jul 2025 16:34)        RADIOLOGY & ADDITIONAL TESTS:  < from: CT Chest No Cont (07.14.25 @ 18:50) >  IMPRESSION:  Severe elevation of the left hemidiaphragm, similar to the prior study    No pneumonia. No pleural effusion.    Nonobstructing bilateral renal calculi and bilateral renal cysts.    < end of copied text >  < from: CT Chest No Cont (07.14.25 @ 18:50) >  IMPRESSION:  Severe elevation of the left hemidiaphragm, similar to the prior study    No pneumonia. No pleural effusion.    Nonobstructing bilateral renal calculi and bilateral renal cysts.    < end of copied text >    Imaging Personally Reviewed:  YES    Consultant(s) Notes Reviewed:   YES    Care Discussed with Consultants :     Plan of care was discussed with patient and /or primary care giver; all questions and concerns were addressed and care was aligned with patient's wishes.

## 2025-07-15 NOTE — PROGRESS NOTE ADULT - SUBJECTIVE AND OBJECTIVE BOX
pt seen in icu [  ], reg med floor [ x  ], bed [  ], chair at bedside [   ]  Awake, alert comfortable in bed in NAD.    REVIEW OF SYSTEMS:    CONSTITUTIONAL: No weakness, fevers or chills  EYES/ENT: No visual changes;  No vertigo or throat pain   NECK: No pain or stiffness  RESPIRATORY: No cough, wheezing, hemoptysis; No shortness of breath  CARDIOVASCULAR: No chest pain or palpitations  GASTROINTESTINAL: No abdominal or epigastric pain. No nausea, vomiting, or hematemesis; No diarrhea or constipation. No melena or hematochezia.  GENITOURINARY: No dysuria, frequency or hematuria  NEUROLOGICAL: No numbness or weakness  SKIN: No itching, burning, rashes, or lesions   All other review of systems is negative unless indicated above.    Physical Exam    General: WN/WD NAD  Neurology: A&Ox3, nonfocal, BAILON x 4  Respiratory: CTA B/L  CV: RRR, S1S2, no murmurs, rubs or gallops  Abdominal: Soft, NT, ND +BS, Last BM  Extremities: No edema, + peripheral pulses      Allergies  No Known Allergies      Health Issues  Sepsis    Diabetes mellitus    Hypertension    Hyperlipidemia    No significant past surgical history        Vitals  T(F): 97.3 (07-15-25 @ 07:10), Max: 97.9 (07-14-25 @ 15:01)  HR: 78 (07-15-25 @ 07:10) (75 - 96)  BP: 115/64 (07-15-25 @ 07:10) (115/64 - 144/93)  RR: 19 (07-15-25 @ 07:10) (18 - 19)  SpO2: 92% (07-15-25 @ 07:10) (92% - 96%)  Wt(kg): --  CAPILLARY BLOOD GLUCOSE      POCT Blood Glucose.: 181 mg/dL (15 Jul 2025 07:45)      Labs                          13.4   8.73  )-----------( 203      ( 14 Jul 2025 05:13 )             38.7       07-14    138  |  104  |  15  ----------------------------<  193[H]  3.5   |  27  |  1.02    Ca    8.8      14 Jul 2025 05:13  Phos  3.1     07-14  Mg     1.7     07-14              Radiology Results  < from: CT Chest No Cont (07.14.25 @ 18:50) >  IMPRESSION:  Severe elevation of the left hemidiaphragm, similar to the prior study    No pneumonia. No pleural effusion.    Nonobstructing bilateral renal calculi and bilateral renal cysts.      < end of copied text >      Meds    MEDICATIONS  (STANDING):  cefTRIAXone   IVPB 1000 milliGRAM(s) IV Intermittent every 24 hours  dextrose 5%. 1000 milliLiter(s) (50 mL/Hr) IV Continuous <Continuous>  dextrose 5%. 1000 milliLiter(s) (100 mL/Hr) IV Continuous <Continuous>  dextrose 50% Injectable 25 Gram(s) IV Push once  dextrose 50% Injectable 12.5 Gram(s) IV Push once  dextrose 50% Injectable 25 Gram(s) IV Push once  glucagon  Injectable 1 milliGRAM(s) IntraMuscular once  heparin   Injectable 5000 Unit(s) SubCutaneous every 12 hours  insulin glargine Injectable (LANTUS) 13 Unit(s) SubCutaneous at bedtime  insulin lispro (ADMELOG) corrective regimen sliding scale   SubCutaneous three times a day before meals  insulin lispro Injectable (ADMELOG) 6 Unit(s) SubCutaneous three times a day before meals      MEDICATIONS  (PRN):  acetaminophen     Tablet .. 650 milliGRAM(s) Oral every 6 hours PRN Temp greater or equal to 38C (100.4F), Mild Pain (1 - 3)  aluminum hydroxide/magnesium hydroxide/simethicone Suspension 30 milliLiter(s) Oral every 4 hours PRN Dyspepsia  dextrose Oral Gel 15 Gram(s) Oral once PRN Blood Glucose LESS THAN 70 milliGRAM(s)/deciliter  ondansetron Injectable 4 milliGRAM(s) IV Push every 8 hours PRN Nausea and/or Vomiting         pt seen in icu [  ], reg med floor [ x  ], bed [  ], chair at bedside [   ]  Awake, alert comfortable in bed in NAD. S/p Stress Test    REVIEW OF SYSTEMS:    CONSTITUTIONAL: No weakness, fevers or chills  EYES/ENT: No visual changes;  No vertigo or throat pain   NECK: No pain or stiffness  RESPIRATORY: No cough, wheezing, hemoptysis; No shortness of breath  CARDIOVASCULAR: No chest pain or palpitations  GASTROINTESTINAL: No abdominal or epigastric pain. No nausea, vomiting, or hematemesis; No diarrhea or constipation. No melena or hematochezia.  GENITOURINARY: No dysuria, frequency or hematuria  NEUROLOGICAL: No numbness or weakness  SKIN: No itching, burning, rashes, or lesions   All other review of systems is negative unless indicated above.    Physical Exam    General: WN/WD NAD  Neurology: A&Ox3, nonfocal, BAILON x 4  Respiratory: CTA B/L  CV: RRR, S1S2, no murmurs, rubs or gallops  Abdominal: Soft, NT, ND +BS, Last BM  Extremities: No edema, + peripheral pulses      Allergies  No Known Allergies      Health Issues  Sepsis    Diabetes mellitus    Hypertension    Hyperlipidemia    No significant past surgical history        Vitals  T(F): 97.3 (07-15-25 @ 07:10), Max: 97.9 (07-14-25 @ 15:01)  HR: 78 (07-15-25 @ 07:10) (75 - 96)  BP: 115/64 (07-15-25 @ 07:10) (115/64 - 144/93)  RR: 19 (07-15-25 @ 07:10) (18 - 19)  SpO2: 92% (07-15-25 @ 07:10) (92% - 96%)  Wt(kg): --  CAPILLARY BLOOD GLUCOSE      POCT Blood Glucose.: 181 mg/dL (15 Jul 2025 07:45)      Labs                          13.4   8.73  )-----------( 203      ( 14 Jul 2025 05:13 )             38.7       07-14    138  |  104  |  15  ----------------------------<  193[H]  3.5   |  27  |  1.02    Ca    8.8      14 Jul 2025 05:13  Phos  3.1     07-14  Mg     1.7     07-14              Radiology Results  < from: CT Chest No Cont (07.14.25 @ 18:50) >  IMPRESSION:  Severe elevation of the left hemidiaphragm, similar to the prior study    No pneumonia. No pleural effusion.    Nonobstructing bilateral renal calculi and bilateral renal cysts.      < end of copied text >  < from: Nuclear Stress Test-Pharmacologic.. (07.14.25 @ 07:57) >  Conclusions:   1. Normal myocardial perfusion scan, with no evidence of infarction or inducible ischemia.   2. Baseline electrocardiogram: normal sinus rhythm at a rate of 71 bpm with no arrhythmias.   3. Stress electrocardiogram: No significant ischemic ST segment changes.   4. Normal left ventricular regional wall motion.   5. The left ventricle is normal in function and normal in size. The post stress left ventricular EF is 68 %. The stress end diastolic volume is 33 ml and systolic volume is 10 ml.      < end of copied text >      Meds    MEDICATIONS  (STANDING):  cefTRIAXone   IVPB 1000 milliGRAM(s) IV Intermittent every 24 hours  dextrose 5%. 1000 milliLiter(s) (50 mL/Hr) IV Continuous <Continuous>  dextrose 5%. 1000 milliLiter(s) (100 mL/Hr) IV Continuous <Continuous>  dextrose 50% Injectable 25 Gram(s) IV Push once  dextrose 50% Injectable 12.5 Gram(s) IV Push once  dextrose 50% Injectable 25 Gram(s) IV Push once  glucagon  Injectable 1 milliGRAM(s) IntraMuscular once  heparin   Injectable 5000 Unit(s) SubCutaneous every 12 hours  insulin glargine Injectable (LANTUS) 13 Unit(s) SubCutaneous at bedtime  insulin lispro (ADMELOG) corrective regimen sliding scale   SubCutaneous three times a day before meals  insulin lispro Injectable (ADMELOG) 6 Unit(s) SubCutaneous three times a day before meals      MEDICATIONS  (PRN):  acetaminophen     Tablet .. 650 milliGRAM(s) Oral every 6 hours PRN Temp greater or equal to 38C (100.4F), Mild Pain (1 - 3)  aluminum hydroxide/magnesium hydroxide/simethicone Suspension 30 milliLiter(s) Oral every 4 hours PRN Dyspepsia  dextrose Oral Gel 15 Gram(s) Oral once PRN Blood Glucose LESS THAN 70 milliGRAM(s)/deciliter  ondansetron Injectable 4 milliGRAM(s) IV Push every 8 hours PRN Nausea and/or Vomiting

## 2025-07-15 NOTE — PROGRESS NOTE ADULT - PROBLEM SELECTOR PLAN 1
Cultures noted  Antibiotics  Monitor Temp and WBC  CXR noted  CT chest w/o contrast  ID Consult. Cultures noted  Antibiotics  Monitor Temp and WBC  CXR noted  CT chest w/o contrast noted  ID Consult.

## 2025-07-15 NOTE — CONSULT NOTE ADULT - ASSESSMENT
80M Dementia (baseline AAO 2-3), HLD, & HTN BIBEMS for fall AM. Pt poor historian. States that he was in the shower in the morning and felt dizzy then was on floor and hit his head,YANG,sepsis,sinus tach and syncope.  1.Tele monitoring.  2.YANG-IVF.  3.DM-Insulin,check A1c.  4.Sepsis-pan cx.  5.Dopplers-r/o dvt.  6.Echocardiogram.  7.Abnl CXR-CT chest w/o contrast.  8.Check TSH,Lipid,vit b12 and d.  9.GI and DVT prophylaxis.
80M Dementia (baseline AAO 2-3), HLD, & HTN BIBEMS for fall AM.   Endocrine consulted for dm management. Pt takes januvia as out pt- lives with his wife does not know fsg

## 2025-07-15 NOTE — CONSULT NOTE ADULT - SUBJECTIVE AND OBJECTIVE BOX
Patient is a 80y old  Male who presents with a chief complaint of Syncope (14 Jul 2025 11:15)      HPI:  80M Dementia (baseline AAO 2-3), HLD, & HTN BIBEMS for fall AM. Pt poor historian. States that he was in the shower in the morning and felt dizzy then fell and hit his head. Denies loss of consciousness, any medical history, neck pain, changes in vision, headache. As per pt, he takes only 1 pill for DM. Also c/o abdominal pain that is new as per pt. Collateral obtained by wife who went to his side & witnessed the fall. Confirmed no LOC. Wife called ambulance.     Pre-Syncope, SIRS (sepsis 2/2 unknown source), HTN, HLD, dementia, Hyperglycemia, Urinary Retension, Elevated Lactate    In the ED,   v/s: 97.6F, , /82, 95% RA  Labs: WBC 19, L shift, BUN/Cr 20/1.67, Glucose 423, trop 16.6, Acetone serum moderate, VBG 7.29, bicarb 24, CO2 50, AG 12, Lactate 3.6 > s/p 2L 3.3> 1L> REPEAT LACTATE    EKG: NSR, No TWI, Ischemia or Infarction  UA: Negative for UTI, Ketonuria & Glucosuria  Limited RVP: Neg  CXR: Lungs clear, L hemidiaphragm elevation seen previously  CTH: Volume loss and chronic microvascular ischemic changes. CT CERVICAL SPINE: No fracture. High-grade bilateral C5-C6 and C6-C7. Foraminal stenosis due to advanced disc and uncovertebral joint degeneration.  s/p CODE sepsis > CTX  s/p bolus 3L  Bladder scan: significant for >700mL, sinhg placed (12 Jul 2025 16:03)      PAST MEDICAL & SURGICAL HISTORY:  Diabetes mellitus      Hypertension      Hyperlipidemia      No significant past surgical history             MEDICATIONS  (STANDING):  cefTRIAXone   IVPB 1000 milliGRAM(s) IV Intermittent every 24 hours  dextrose 5%. 1000 milliLiter(s) (50 mL/Hr) IV Continuous <Continuous>  dextrose 5%. 1000 milliLiter(s) (100 mL/Hr) IV Continuous <Continuous>  dextrose 50% Injectable 25 Gram(s) IV Push once  dextrose 50% Injectable 12.5 Gram(s) IV Push once  dextrose 50% Injectable 25 Gram(s) IV Push once  glucagon  Injectable 1 milliGRAM(s) IntraMuscular once  heparin   Injectable 5000 Unit(s) SubCutaneous every 12 hours  insulin glargine Injectable (LANTUS) 13 Unit(s) SubCutaneous at bedtime  insulin lispro (ADMELOG) corrective regimen sliding scale   SubCutaneous three times a day before meals  insulin lispro Injectable (ADMELOG) 4 Unit(s) SubCutaneous three times a day before meals    MEDICATIONS  (PRN):  acetaminophen     Tablet .. 650 milliGRAM(s) Oral every 6 hours PRN Temp greater or equal to 38C (100.4F), Mild Pain (1 - 3)  aluminum hydroxide/magnesium hydroxide/simethicone Suspension 30 milliLiter(s) Oral every 4 hours PRN Dyspepsia  dextrose Oral Gel 15 Gram(s) Oral once PRN Blood Glucose LESS THAN 70 milliGRAM(s)/deciliter  ondansetron Injectable 4 milliGRAM(s) IV Push every 8 hours PRN Nausea and/or Vomiting      FAMILY HISTORY:      SOCIAL HISTORY:      REVIEW OF SYSTEMS:  CONSTITUTIONAL: No fever, weight loss, or fatigue  EYES: No eye pain, visual disturbances, or discharge  ENT:  No difficulty hearing, tinnitus, vertigo; No sinus or throat pain  NECK: No pain or stiffness  RESPIRATORY: No cough, wheezing, chills or hemoptysis; No Shortness of Breath  CARDIOVASCULAR: No chest pain, palpitations, passing out, dizziness, or leg swelling  GASTROINTESTINAL: No abdominal or epigastric pain. No nausea, vomiting, or hematemesis; No diarrhea or constipation. No melena or hematochezia.  GENITOURINARY: No dysuria, frequency, hematuria, or incontinence  NEUROLOGICAL: No headaches, memory loss, loss of strength, numbness, or tremors  SKIN: No itching, burning, rashes, or lesions   LYMPH Nodes: No enlarged glands  ENDOCRINE: No heat or cold intolerance; No hair loss  MUSCULOSKELETAL: No joint pain or swelling; No muscle, back, or extremity pain  PSYCHIATRIC: No depression, anxiety, mood swings, or difficulty sleeping  HEME/LYMPH: No easy bruising, or bleeding gums  ALLERGY AND IMMUNOLOGIC: No hives or eczema	        Vital Signs Last 24 Hrs  T(C): 36.3 (15 Jul 2025 07:10), Max: 36.6 (14 Jul 2025 15:01)  T(F): 97.3 (15 Jul 2025 07:10), Max: 97.9 (14 Jul 2025 15:01)  HR: 78 (15 Jul 2025 07:10) (75 - 96)  BP: 115/64 (15 Jul 2025 07:10) (115/64 - 144/93)  BP(mean): --  RR: 19 (15 Jul 2025 07:10) (18 - 19)  SpO2: 92% (15 Jul 2025 07:10) (92% - 96%)    Parameters below as of 15 Jul 2025 07:10  Patient On (Oxygen Delivery Method): room air          Constitutional:    NC/AT:    HEENT:    Neck:  No JVD, bruits or thyromegaly    Respiratory:  Clear without rales or rhonchi    Cardiovascular:  RR without murmur, rub or gallop.    Gastrointestinal: Soft without hepatosplenomegaly.    Extremities: without cyanosis, clubbing or edema.    Neurological:  Oriented   x      . No gross sensory or motor defects.        LABS:                        13.4   8.73  )-----------( 203      ( 14 Jul 2025 05:13 )             38.7     07-14    138  |  104  |  15  ----------------------------<  193[H]  3.5   |  27  |  1.02    Ca    8.8      14 Jul 2025 05:13  Phos  3.1     07-14  Mg     1.7     07-14            Urinalysis Basic - ( 14 Jul 2025 05:13 )    Color: x / Appearance: x / SG: x / pH: x  Gluc: 193 mg/dL / Ketone: x  / Bili: x / Urobili: x   Blood: x / Protein: x / Nitrite: x   Leuk Esterase: x / RBC: x / WBC x   Sq Epi: x / Non Sq Epi: x / Bacteria: x      CAPILLARY BLOOD GLUCOSE      POCT Blood Glucose.: 181 mg/dL (15 Jul 2025 07:45)  POCT Blood Glucose.: 240 mg/dL (14 Jul 2025 21:04)  POCT Blood Glucose.: 322 mg/dL (14 Jul 2025 16:34)      RADIOLOGY & ADDITIONAL STUDIES: Patient is a 80y old  Male who presents with a chief complaint of Syncope (14 Jul 2025 11:15)      HPI:  80M Dementia (baseline AAO 2-3), HLD, & HTN BIBEMS for fall AM. Pt poor historian. States that he was in the shower in the morning and felt dizzy then fell and hit his head. Denies loss of consciousness, any medical history, neck pain, changes in vision, headache. As per pt, he takes only 1 pill for DM. Also c/o abdominal pain that is new as per pt. Collateral obtained by wife who went to his side & witnessed the fall. Confirmed no LOC. Wife called ambulance.   Endocrine consulted for dm management. Pt takes januvia as out pt- lives with his wife does not know fsg .     Pre-Syncope, SIRS (sepsis 2/2 unknown source), HTN, HLD, dementia, Hyperglycemia, Urinary Retension, Elevated Lactate    In the ED,   v/s: 97.6F, , /82, 95% RA  Labs: WBC 19, L shift, BUN/Cr 20/1.67, Glucose 423, trop 16.6, Acetone serum moderate, VBG 7.29, bicarb 24, CO2 50, AG 12, Lactate 3.6 > s/p 2L 3.3> 1L> REPEAT LACTATE    EKG: NSR, No TWI, Ischemia or Infarction  UA: Negative for UTI, Ketonuria & Glucosuria  Limited RVP: Neg  CXR: Lungs clear, L hemidiaphragm elevation seen previously  CTH: Volume loss and chronic microvascular ischemic changes. CT CERVICAL SPINE: No fracture. High-grade bilateral C5-C6 and C6-C7. Foraminal stenosis due to advanced disc and uncovertebral joint degeneration.  s/p CODE sepsis > CTX  s/p bolus 3L  Bladder scan: significant for >700mL, singh placed (12 Jul 2025 16:03)      PAST MEDICAL & SURGICAL HISTORY:  Diabetes mellitus      Hypertension      Hyperlipidemia      No significant past surgical history             MEDICATIONS  (STANDING):  cefTRIAXone   IVPB 1000 milliGRAM(s) IV Intermittent every 24 hours  dextrose 5%. 1000 milliLiter(s) (50 mL/Hr) IV Continuous <Continuous>  dextrose 5%. 1000 milliLiter(s) (100 mL/Hr) IV Continuous <Continuous>  dextrose 50% Injectable 25 Gram(s) IV Push once  dextrose 50% Injectable 12.5 Gram(s) IV Push once  dextrose 50% Injectable 25 Gram(s) IV Push once  glucagon  Injectable 1 milliGRAM(s) IntraMuscular once  heparin   Injectable 5000 Unit(s) SubCutaneous every 12 hours  insulin glargine Injectable (LANTUS) 13 Unit(s) SubCutaneous at bedtime  insulin lispro (ADMELOG) corrective regimen sliding scale   SubCutaneous three times a day before meals  insulin lispro Injectable (ADMELOG) 4 Unit(s) SubCutaneous three times a day before meals    MEDICATIONS  (PRN):  acetaminophen     Tablet .. 650 milliGRAM(s) Oral every 6 hours PRN Temp greater or equal to 38C (100.4F), Mild Pain (1 - 3)  aluminum hydroxide/magnesium hydroxide/simethicone Suspension 30 milliLiter(s) Oral every 4 hours PRN Dyspepsia  dextrose Oral Gel 15 Gram(s) Oral once PRN Blood Glucose LESS THAN 70 milliGRAM(s)/deciliter  ondansetron Injectable 4 milliGRAM(s) IV Push every 8 hours PRN Nausea and/or Vomiting      FAMILY HISTORY:      SOCIAL HISTORY:      REVIEW OF SYSTEMS: NA      Vital Signs Last 24 Hrs  T(C): 36.3 (15 Jul 2025 07:10), Max: 36.6 (14 Jul 2025 15:01)  T(F): 97.3 (15 Jul 2025 07:10), Max: 97.9 (14 Jul 2025 15:01)  HR: 78 (15 Jul 2025 07:10) (75 - 96)  BP: 115/64 (15 Jul 2025 07:10) (115/64 - 144/93)  BP(mean): --  RR: 19 (15 Jul 2025 07:10) (18 - 19)  SpO2: 92% (15 Jul 2025 07:10) (92% - 96%)    Parameters below as of 15 Jul 2025 07:10  Patient On (Oxygen Delivery Method): room air          Constitutional:    HEENT: nad    Neck:  No JVD, bruits or thyromegaly    Respiratory:  Clear without rales or rhonchi    Cardiovascular:  RR without murmur, rub or gallop.    Gastrointestinal: Soft without hepatosplenomegaly.    Extremities: without cyanosis, clubbing or edema.    Neurological:  Oriented   x   2   . No gross sensory or motor defects.        LABS:                        13.4   8.73  )-----------( 203      ( 14 Jul 2025 05:13 )             38.7     07-14    138  |  104  |  15  ----------------------------<  193[H]  3.5   |  27  |  1.02    Ca    8.8      14 Jul 2025 05:13  Phos  3.1     07-14  Mg     1.7     07-14            Urinalysis Basic - ( 14 Jul 2025 05:13 )    Color: x / Appearance: x / SG: x / pH: x  Gluc: 193 mg/dL / Ketone: x  / Bili: x / Urobili: x   Blood: x / Protein: x / Nitrite: x   Leuk Esterase: x / RBC: x / WBC x   Sq Epi: x / Non Sq Epi: x / Bacteria: x      CAPILLARY BLOOD GLUCOSE      POCT Blood Glucose.: 181 mg/dL (15 Jul 2025 07:45)  POCT Blood Glucose.: 240 mg/dL (14 Jul 2025 21:04)  POCT Blood Glucose.: 322 mg/dL (14 Jul 2025 16:34)      RADIOLOGY & ADDITIONAL STUDIES:

## 2025-07-15 NOTE — CONSULT NOTE ADULT - PROBLEM SELECTOR RECOMMENDATION 9
uncontrolled with hyperglycemia  cont lantus 13 units  inc admelog to 6 ac tid  a1c-12.2  fsg ac and hs  pt refusing insulin   consider adding 2 more oral agents ? metformin and farxiga

## 2025-07-15 NOTE — PROGRESS NOTE ADULT - SUBJECTIVE AND OBJECTIVE BOX
Date of Service 07-15-25 @ 10:22    CHIEF COMPLAINT:Patient is a 80y old  Male who presents with a chief complaint of S/P Fall .Pt appears comfortable.    	  REVIEW OF SYSTEMS:  CONSTITUTIONAL: No fever, weight loss, or fatigue  EYES: No eye pain, visual disturbances, or discharge  ENT:  No difficulty hearing, tinnitus, vertigo; No sinus or throat pain  NECK: No pain or stiffness  RESPIRATORY: No cough, wheezing, chills or hemoptysis; No Shortness of Breath  CARDIOVASCULAR: No chest pain, palpitations, passing out, dizziness, or leg swelling  GASTROINTESTINAL: No abdominal or epigastric pain. No nausea, vomiting, or hematemesis; No diarrhea or constipation. No melena or hematochezia.  GENITOURINARY: No dysuria, frequency, hematuria, or incontinence  NEUROLOGICAL: No headaches, memory loss, loss of strength, numbness, or tremors  SKIN: No itching, burning, rashes, or lesions   LYMPH Nodes: No enlarged glands  ENDOCRINE: No heat or cold intolerance; No hair loss  MUSCULOSKELETAL: No joint pain or swelling; No muscle, back, or extremity pain  PSYCHIATRIC: No depression, anxiety, mood swings, or difficulty sleeping  HEME/LYMPH: No easy bruising, or bleeding gums  ALLERGY AND IMMUNOLOGIC: No hives or eczema	    Tele-nsr,sinus tach      PHYSICAL EXAM:  T(C): 36.3 (07-15-25 @ 07:10), Max: 36.6 (07-14-25 @ 15:01)  HR: 78 (07-15-25 @ 07:10) (75 - 96)  BP: 115/64 (07-15-25 @ 07:10) (115/64 - 144/93)  RR: 19 (07-15-25 @ 07:10) (18 - 19)  SpO2: 92% (07-15-25 @ 07:10) (92% - 96%)  Wt(kg): --  I&O's Summary    14 Jul 2025 07:01  -  15 Jul 2025 07:00  --------------------------------------------------------  IN: 0 mL / OUT: 1400 mL / NET: -1400 mL        Appearance: Normal	  HEENT:   Normal oral mucosa, PERRL, EOMI	  Lymphatic: No lymphadenopathy  Cardiovascular: Normal S1 S2, No JVD, No murmurs, No edema  Respiratory: Lungs clear to auscultation	  Psychiatry: A & O x 3, Mood & affect appropriate  Gastrointestinal:  Soft, Non-tender, + BS	  Skin: No rashes, No ecchymoses, No cyanosis	  Neurologic: Non-focal  Extremities: Normal range of motion, No clubbing, cyanosis or edema  Vascular: Peripheral pulses palpable 2+ bilaterally    MEDICATIONS  (STANDING):  cefTRIAXone   IVPB 1000 milliGRAM(s) IV Intermittent every 24 hours  dextrose 5%. 1000 milliLiter(s) (50 mL/Hr) IV Continuous <Continuous>  dextrose 5%. 1000 milliLiter(s) (100 mL/Hr) IV Continuous <Continuous>  dextrose 50% Injectable 25 Gram(s) IV Push once  dextrose 50% Injectable 12.5 Gram(s) IV Push once  dextrose 50% Injectable 25 Gram(s) IV Push once  glucagon  Injectable 1 milliGRAM(s) IntraMuscular once  heparin   Injectable 5000 Unit(s) SubCutaneous every 12 hours  insulin glargine Injectable (LANTUS) 13 Unit(s) SubCutaneous at bedtime  insulin lispro (ADMELOG) corrective regimen sliding scale   SubCutaneous three times a day before meals  insulin lispro Injectable (ADMELOG) 6 Unit(s) SubCutaneous three times a day before meals        	  LABS:	 	    Troponin I, High Sensitivity Result: 16.6 ng/L (07-12 @ 12:20)                            13.4   8.73  )-----------( 203      ( 14 Jul 2025 05:13 )             38.7     07-14    138  |  104  |  15  ----------------------------<  193[H]  3.5   |  27  |  1.02    Ca    8.8      14 Jul 2025 05:13  Phos  3.1     07-14  Mg     1.7     07-14      proBNP:   Lipid Profile: Cholesterol 134  LDL --  HDL 45    Ldl calc 64  Ratio --    HgA1c:   TSH: Thyroid Stimulating Hormone, Serum: 2.42 uU/mL (07-14 @ 05:13)      	    < from: Nuclear Stress Test-Pharmacologic.. (07.14.25 @ 07:57) >  Conclusions:   1. Normal myocardial perfusion scan, with no evidence of infarction or inducible ischemia.   2. Baseline electrocardiogram: normal sinus rhythm at a rate of 71 bpm with no arrhythmias.   3. Stress electrocardiogram: No significant ischemic ST segment changes.   4. Normal left ventricular regional wall motion.   5. The left ventricle is normal in function and normal in size. The post stress left ventricular EF is 68 %. The stress end diastolic volume is 33 ml and systolic volume is 10 ml.    < end of copied text >    < from: TTE W or WO Ultrasound Enhancing Agent (07.13.25 @ 15:19) >  CONCLUSIONS:      1. Uninterpretable Study, No acoustic windows.    ________________________________________________________________________________________  Electronically signed on 7/14/2025 at 11:43:42 AM by Lazaro Pulido MD      < end of copied text >  < from: CT Chest No Cont (07.14.25 @ 18:50) >  ACC: [id number] EXAM:  CT CHEST   ORDERED BY: IRMA ADLER     PROCEDURE DATE:  07/14/2025          INTERPRETATION:  CLINICAL INFORMATION: Abnormal chest x-ray    COMPARISON: Chest x-ray 7/12/2025. CT chest abdomen pelvis 1/17/2016    CONTRAST/COMPLICATIONS:  IV Contrast: NONE  Oral Contrast: NONE.    PROCEDURE:  CT of the Chest was performed.  Sagittal and coronal reformats were performed.    FINDINGS:    LUNGS : There is severe elevation of the left hemidiaphragm with   atelectasis of the left lower lobe, similar to the prior CT a granuloma   is seen in the left upper lobe peripherally, medially in the right middle   lobe. Scattered atelectasis in the right lower lobe and left upper lobe.  VESSELS: Aortic calcifications. Coronary arterycalcifications. Ascending   aorta is dilated measuring 4.1 cm,, stable.  HEART: Heart size is normal. No pericardial effusion.  MEDIASTINUM AND CASSANDRA: No lymphadenopathy.  CHEST WALL AND LOWER NECK: Within normal limits.  VISUALIZED UPPER ABDOMEN: Nonobstructing calculus is seen in the left   kidney measuring 8 mm. There is a left upper pole renal cyst measuring 4   cm. A right mid pole cyst measures 3 cm. There is a nonobstructing stone   in the midportion of the right kidney measuring 7 mm.  BONES: Degenerative changes.    IMPRESSION:  Severe elevation of the left hemidiaphragm, similar to the prior study    No pneumonia. No pleural effusion.    Nonobstructing bilateral renal calculi and bilateral renal cysts.    --- End of Report ---            VERONICA OCHOA MD; Attending Interventional Radiologist  This document has been electronically signed. Jul 14 2025  8:51P    < end of copied text >

## 2025-07-15 NOTE — PROGRESS NOTE ADULT - PROBLEM SELECTOR PLAN 2
Tele monitoring  EKG  Echo   Cardio follow up  CT head noted. Tele monitoring  EKG  Echo noted  Cardio follow up  CT head noted.

## 2025-07-15 NOTE — PROGRESS NOTE ADULT - PROBLEM SELECTOR PLAN 2
-   Patient presented abdominal pain   -   PE significant for lower abdominal mass consistent w/ bladder   -   Bladder scan: significant for >700mL  -   Barth placed  -   TOV today

## 2025-07-16 ENCOUNTER — TRANSCRIPTION ENCOUNTER (OUTPATIENT)
Age: 80
End: 2025-07-16

## 2025-07-16 LAB
GLUCOSE BLDC GLUCOMTR-MCNC: 241 MG/DL — HIGH (ref 70–99)
GLUCOSE BLDC GLUCOMTR-MCNC: 263 MG/DL — HIGH (ref 70–99)
GLUCOSE BLDC GLUCOMTR-MCNC: 266 MG/DL — HIGH (ref 70–99)
GLUCOSE BLDC GLUCOMTR-MCNC: 290 MG/DL — HIGH (ref 70–99)

## 2025-07-16 PROCEDURE — 70450 CT HEAD/BRAIN W/O DYE: CPT

## 2025-07-16 PROCEDURE — 82010 KETONE BODYS QUAN: CPT

## 2025-07-16 PROCEDURE — 84300 ASSAY OF URINE SODIUM: CPT

## 2025-07-16 PROCEDURE — 36415 COLL VENOUS BLD VENIPUNCTURE: CPT

## 2025-07-16 PROCEDURE — 97162 PT EVAL MOD COMPLEX 30 MIN: CPT

## 2025-07-16 PROCEDURE — 84100 ASSAY OF PHOSPHORUS: CPT

## 2025-07-16 PROCEDURE — 82962 GLUCOSE BLOOD TEST: CPT

## 2025-07-16 PROCEDURE — 81001 URINALYSIS AUTO W/SCOPE: CPT

## 2025-07-16 PROCEDURE — 93017 CV STRESS TEST TRACING ONLY: CPT

## 2025-07-16 PROCEDURE — 82009 KETONE BODYS QUAL: CPT

## 2025-07-16 PROCEDURE — 83735 ASSAY OF MAGNESIUM: CPT

## 2025-07-16 PROCEDURE — 84443 ASSAY THYROID STIM HORMONE: CPT

## 2025-07-16 PROCEDURE — 85027 COMPLETE CBC AUTOMATED: CPT

## 2025-07-16 PROCEDURE — 84156 ASSAY OF PROTEIN URINE: CPT

## 2025-07-16 PROCEDURE — 84484 ASSAY OF TROPONIN QUANT: CPT

## 2025-07-16 PROCEDURE — 83605 ASSAY OF LACTIC ACID: CPT

## 2025-07-16 PROCEDURE — 82570 ASSAY OF URINE CREATININE: CPT

## 2025-07-16 PROCEDURE — 97110 THERAPEUTIC EXERCISES: CPT

## 2025-07-16 PROCEDURE — 71250 CT THORAX DX C-: CPT

## 2025-07-16 PROCEDURE — 84133 ASSAY OF URINE POTASSIUM: CPT

## 2025-07-16 PROCEDURE — 80048 BASIC METABOLIC PNL TOTAL CA: CPT

## 2025-07-16 PROCEDURE — 80061 LIPID PANEL: CPT

## 2025-07-16 PROCEDURE — 97116 GAIT TRAINING THERAPY: CPT

## 2025-07-16 PROCEDURE — 84132 ASSAY OF SERUM POTASSIUM: CPT

## 2025-07-16 PROCEDURE — 85730 THROMBOPLASTIN TIME PARTIAL: CPT

## 2025-07-16 PROCEDURE — 93306 TTE W/DOPPLER COMPLETE: CPT

## 2025-07-16 PROCEDURE — 83935 ASSAY OF URINE OSMOLALITY: CPT

## 2025-07-16 PROCEDURE — 87637 SARSCOV2&INF A&B&RSV AMP PRB: CPT

## 2025-07-16 PROCEDURE — 83036 HEMOGLOBIN GLYCOSYLATED A1C: CPT

## 2025-07-16 PROCEDURE — 72125 CT NECK SPINE W/O DYE: CPT

## 2025-07-16 PROCEDURE — A9502: CPT

## 2025-07-16 PROCEDURE — 97530 THERAPEUTIC ACTIVITIES: CPT

## 2025-07-16 PROCEDURE — 82947 ASSAY GLUCOSE BLOOD QUANT: CPT

## 2025-07-16 PROCEDURE — 78452 HT MUSCLE IMAGE SPECT MULT: CPT

## 2025-07-16 PROCEDURE — 93970 EXTREMITY STUDY: CPT

## 2025-07-16 PROCEDURE — 82746 ASSAY OF FOLIC ACID SERUM: CPT

## 2025-07-16 PROCEDURE — 72170 X-RAY EXAM OF PELVIS: CPT

## 2025-07-16 PROCEDURE — 85025 COMPLETE CBC W/AUTO DIFF WBC: CPT

## 2025-07-16 PROCEDURE — 84540 ASSAY OF URINE/UREA-N: CPT

## 2025-07-16 PROCEDURE — 87040 BLOOD CULTURE FOR BACTERIA: CPT

## 2025-07-16 PROCEDURE — 80053 COMPREHEN METABOLIC PANEL: CPT

## 2025-07-16 PROCEDURE — 84295 ASSAY OF SERUM SODIUM: CPT

## 2025-07-16 PROCEDURE — 85610 PROTHROMBIN TIME: CPT

## 2025-07-16 PROCEDURE — 82330 ASSAY OF CALCIUM: CPT

## 2025-07-16 PROCEDURE — 82803 BLOOD GASES ANY COMBINATION: CPT

## 2025-07-16 PROCEDURE — 71045 X-RAY EXAM CHEST 1 VIEW: CPT

## 2025-07-16 RX ADMIN — HEPARIN SODIUM 5000 UNIT(S): 1000 INJECTION INTRAVENOUS; SUBCUTANEOUS at 05:32

## 2025-07-16 RX ADMIN — HEPARIN SODIUM 5000 UNIT(S): 1000 INJECTION INTRAVENOUS; SUBCUTANEOUS at 17:03

## 2025-07-16 RX ADMIN — INSULIN GLARGINE-YFGN 13 UNIT(S): 100 INJECTION, SOLUTION SUBCUTANEOUS at 22:00

## 2025-07-16 NOTE — PROGRESS NOTE ADULT - PROBLEM/PLAN-2
From: Lalitha Squires  Sent: 11/30/2022 6:13 PM CST  To: [facility] [place]  Subject: recommendations    No I do not.   
DISPLAY PLAN FREE TEXT

## 2025-07-16 NOTE — DISCHARGE NOTE PROVIDER - NSDCMRMEDTOKEN_GEN_ALL_CORE_FT
benazepril 20 mg oral tablet: 1 tab(s) orally  Januvia 100 mg oral tablet: 1 tab(s) orally once a day   acetaminophen 325 mg oral tablet: 2 tab(s) orally every 6 hours As needed Temp greater or equal to 38C (100.4F), Mild Pain (1 - 3)  aluminum hydroxide-magnesium hydroxide 200 mg-200 mg/5 mL oral suspension: 30 milliliter(s) orally every 4 hours As needed Dyspepsia  Januvia 100 mg oral tablet: 1 tab(s) orally once a day  nateglinide 60 mg oral tablet: 1 tab(s) orally 3 times a day  Synjardy XR 10 mg-1000 mg oral tablet, extended release: 1 tab(s) orally once a day

## 2025-07-16 NOTE — PROGRESS NOTE ADULT - PROBLEM SELECTOR PLAN 5
-  History of DM on Januvia  -  Hold PO meds on Januvia  -  Glucose 423, Acetone serum moderate, VBG 7.29, bicarb 24, CO2 50, AG 12  -  s/p 10 admelog in ED  -  Started 13 glargine & 4 pre-meals  -  Finger sticks AC and HS  -  Sliding scale as ordered  -  HgbA1c  12  -  Endocrine consulted:  Dr Muñiz
-  History of DM on Januvia  -  Hold PO meds on Januvia  -  Glucose 423, Acetone serum moderate, VBG 7.29, bicarb 24, CO2 50, AG 12  -  s/p 10 admelog in ED  -  Started 13 glargine & 6 pre-meals  -  Finger sticks AC and HS  -  Sliding scale as ordered  -  HgbA1c  12.2  -  Endocrine consulted:  Dr Muñiz    -  Endo recs> pt refusing insulin - D/W PT AND WIFE AT LENGTH   rec synjardy 10/100 qam, januvia qd and starlix 60mg ac tid upon d/c
Lipid panel noted
Lipid panel noted
-  History of DM on Januvia  -  Hold PO meds on Januvia  -  Glucose 423, Acetone serum moderate, VBG 7.29, bicarb 24, CO2 50, AG 12  -  s/p 10 admelog in ED  -  Started 13 glargine & 4 pre-meals  -  Finger sticks AC and HS  -  Sliding scale as ordered  -  HgbA1c  12.2  -  Endocrine consulted:  Dr Muñiz

## 2025-07-16 NOTE — PROGRESS NOTE ADULT - SUBJECTIVE AND OBJECTIVE BOX
Interval Events:      Allergies    No Known Allergies    Intolerances      Endocrine/Metabolic Medications:  dextrose 50% Injectable 25 Gram(s) IV Push once  dextrose 50% Injectable 12.5 Gram(s) IV Push once  dextrose 50% Injectable 25 Gram(s) IV Push once  dextrose Oral Gel 15 Gram(s) Oral once PRN  glucagon  Injectable 1 milliGRAM(s) IntraMuscular once  insulin glargine Injectable (LANTUS) 13 Unit(s) SubCutaneous at bedtime  insulin lispro (ADMELOG) corrective regimen sliding scale   SubCutaneous three times a day before meals  insulin lispro Injectable (ADMELOG) 6 Unit(s) SubCutaneous three times a day before meals      Vital Signs Last 24 Hrs  T(C): 36.5 (16 Jul 2025 07:49), Max: 36.9 (15 Jul 2025 19:33)  T(F): 97.7 (16 Jul 2025 07:49), Max: 98.4 (15 Jul 2025 19:33)  HR: 76 (16 Jul 2025 07:49) (71 - 92)  BP: 138/93 (16 Jul 2025 07:49) (100/77 - 153/79)  BP(mean): --  RR: 18 (16 Jul 2025 07:49) (18 - 19)  SpO2: 94% (16 Jul 2025 07:49) (94% - 98%)    Parameters below as of 16 Jul 2025 07:49  Patient On (Oxygen Delivery Method): room air          PHYSICAL EXAM  All physical exam findings normal, except those marked:  General:	Alert, active, cooperative, NAD, well hydrated  .		[] Abnormal:  Neck		Normal: supple, no cervical adenopathy, no palpable thyroid  .		[] Abnormal:  Cardiovascular	Normal: regular rate, normal S1, S2, no murmurs  .		[] Abnormal:  Respiratory	Normal: no chest wall deformity, normal respiratory pattern, CTA B/L  .		[] Abnormal:  Abdominal	Normal: soft, ND, NT, bowel sounds present, no masses, no organomegaly  .		[] Abnormal:  		Normal normal genitalia, testes descended, circumcised/uncircumcised  .		Rashaad stage:			Breast rashaad:  .		Menstrual history:  .		[] Abnormal:  Extremities	Normal: FROM x4  .		[] Abnormal:  Skin		Normal: intact and not indurated, no rash, no acanthosis nigricans  .		[] Abnormal:  Neurologic	Normal: grossly intact  .		[] Abnormal:    LABS        CAPILLARY BLOOD GLUCOSE      POCT Blood Glucose.: 241 mg/dL (16 Jul 2025 07:34)  POCT Blood Glucose.: 241 mg/dL (15 Jul 2025 20:59)  POCT Blood Glucose.: 195 mg/dL (15 Jul 2025 16:26)  POCT Blood Glucose.: 315 mg/dL (15 Jul 2025 11:20)        Assesment/plan       Interval Events:  pt in nad    Allergies    No Known Allergies    Intolerances      Endocrine/Metabolic Medications:  dextrose 50% Injectable 25 Gram(s) IV Push once  dextrose 50% Injectable 12.5 Gram(s) IV Push once  dextrose 50% Injectable 25 Gram(s) IV Push once  dextrose Oral Gel 15 Gram(s) Oral once PRN  glucagon  Injectable 1 milliGRAM(s) IntraMuscular once  insulin glargine Injectable (LANTUS) 13 Unit(s) SubCutaneous at bedtime  insulin lispro (ADMELOG) corrective regimen sliding scale   SubCutaneous three times a day before meals  insulin lispro Injectable (ADMELOG) 6 Unit(s) SubCutaneous three times a day before meals      Vital Signs Last 24 Hrs  T(C): 36.5 (16 Jul 2025 07:49), Max: 36.9 (15 Jul 2025 19:33)  T(F): 97.7 (16 Jul 2025 07:49), Max: 98.4 (15 Jul 2025 19:33)  HR: 76 (16 Jul 2025 07:49) (71 - 92)  BP: 138/93 (16 Jul 2025 07:49) (100/77 - 153/79)  BP(mean): --  RR: 18 (16 Jul 2025 07:49) (18 - 19)  SpO2: 94% (16 Jul 2025 07:49) (94% - 98%)    Parameters below as of 16 Jul 2025 07:49  Patient On (Oxygen Delivery Method): room air          PHYSICAL EXAM  All physical exam findings normal, except those marked:  General:	Alert, active, cooperative, NAD, well hydrated  .		[] Abnormal:  Neck		Normal: supple, no cervical adenopathy, no palpable thyroid  .		[] Abnormal:  Cardiovascular	Normal: regular rate, normal S1, S2, no murmurs  .		[] Abnormal:  Respiratory	Normal: no chest wall deformity, normal respiratory pattern, CTA B/L  .		[] Abnormal:  Abdominal	Normal: soft, ND, NT, bowel sounds present, no masses, no organomegaly  .		[] Abnormal:  		Normal normal genitalia, testes descended, circumcised/uncircumcised  .		Rashaad stage:			Breast rashaad:  .		Menstrual history:  .		[] Abnormal:  Extremities	Normal: FROM x4  .		[] Abnormal:  Skin		Normal: intact and not indurated, no rash, no acanthosis nigricans  .		[] Abnormal:  Neurologic	Normal: grossly intact  .		[] Abnormal:    LABS        CAPILLARY BLOOD GLUCOSE      POCT Blood Glucose.: 241 mg/dL (16 Jul 2025 07:34)  POCT Blood Glucose.: 241 mg/dL (15 Jul 2025 20:59)  POCT Blood Glucose.: 195 mg/dL (15 Jul 2025 16:26)  POCT Blood Glucose.: 315 mg/dL (15 Jul 2025 11:20)        Assesment/plan    80M Dementia (baseline AAO 2-3), HLD, & HTN BIBEMS for fall AM.   Endocrine consulted for dm management. Pt takes januvia as out pt- lives with his wife does not know fsg          Problem/Recommendation - 1:  ·  Problem: Hyperglycemia due to diabetes mellitus.   ·  Recommendation: uncontrolled with hyperglycemia  cont lantus 13 units  admelog to 6 ac tid  a1c-12.2  fsg ac and hs  pt refusing insulin - D/W PT AND WIFE AT LENGTH   rec synjardy 10/100 qam, januvia qd and starlix 60mg ac tid upon d/c   d/w prim team     Problem/Recommendation - 2:  ·  Problem: Near syncope.   ·  Recommendation: tx per prim  cardio recs.

## 2025-07-16 NOTE — PROGRESS NOTE ADULT - PROBLEM SELECTOR PROBLEM 4
Hypertension
Elevated lactic acid level
Elevated lactic acid level
Hypertension
Elevated lactic acid level

## 2025-07-16 NOTE — PROGRESS NOTE ADULT - PROBLEM SELECTOR PLAN 1
-  Patient reports fall in shower AM, felt light headed then fell & hit his head  -  BIBEMS for fall, hit head   -  Denies LOC, loss of control of bowel  and bladder  -  EKG: NSR, No TWI, Ischemia or Infarction  -  UA neg for infection  -  CTH: Volume loss and chronic microvascular ischemic changes. CT CERVICAL SPINE: No fracture. High-grade bilateral C5-C6 and C6-C7. Foraminal stenosis due to advanced disc and uncovertebral joint degeneration.  Admitted to Tele for pre-sycnope w/ head strike  -  PT recs PAVAN  -  TTE no acoustic windows  -  Cardiology  consulted: Dr. Purvis

## 2025-07-16 NOTE — PROGRESS NOTE ADULT - PROBLEM SELECTOR PROBLEM 3
Diabetes mellitus
SIRS (systemic inflammatory response syndrome)
SIRS (systemic inflammatory response syndrome)
Diabetes mellitus
SIRS (systemic inflammatory response syndrome)

## 2025-07-16 NOTE — DISCHARGE NOTE PROVIDER - HOSPITAL COURSE
80M Dementia (baseline AAO 2-3), HLD, & HTN BIBEMS for fall AM.  States that he was in the shower in the morning and felt dizzy then fell and hit his head. Denies loss of consciousness, Also c/o abdominal pain that is new as per pt. Collateral obtained by wife who went to his side & witnessed the fall. Confirmed no LOC.  Admitted to Tele for pre-sycnope w/ head strike    Blood cultures show NGTD  CTH: Volume loss and chronic microvascular ischemic changes. CT CERVICAL SPINE: No fracture. High-grade bilateral C5-C6 and C6-C7. Foraminal stenosis due to advanced disc and uncovertebral joint degeneration.  CT chest Severe elevation of the left hemidiaphragm, which is similar to previous study. TTE Uninterpretable Study, No acoustic windows. Stress test-no ischemia.  YANG with urinary retension, Pased TOV.   Endocrine consulted for A1c of 12.2 rec's for ++++++++++  PT rec PAVAN     INCOMPLETE ++++++   80M Dementia (baseline AAO 2-3), HLD, & HTN BIBEMS for fall AM.  States that he was in the shower in the morning and felt dizzy then fell and hit his head. Denies loss of consciousness, Also c/o abdominal pain that is new as per pt. Collateral obtained by wife who went to his side & witnessed the fall. Confirmed no LOC.  Admitted to [facility] for pre-sycnope w/ head strike    Blood cultures show NGTD  CTH: Volume loss and chronic microvascular ischemic changes. CT CERVICAL SPINE: No fracture. High-grade bilateral C5-C6 and C6-C7. Foraminal stenosis due to advanced disc and uncovertebral joint degeneration.  CT chest Severe elevation of the left hemidiaphragm, which is similar to previous study. TTE Uninterpretable Study, No acoustic windows. Stress test-no ischemia.  YANG with urinary retention, Passed TOV.   Endocrine consulted for A1c of 12.2 recc  Lantus 13 units and Admelog to 6 ac tid.  Pt refusing insulin - D/W PT AND WIFE AT LENGTH rec Synjardy 10/100 qam, januvia qd and Starlix 60mg ac tid upon d/c     PT rec PAVAN     Discussed with attending.  Pt. is medically stable for discharge to [facility].

## 2025-07-16 NOTE — PROGRESS NOTE ADULT - PROBLEM SELECTOR PLAN 2
-   Patient presented abdominal pain   -   PE significant for lower abdominal mass consistent w/ bladder   -   Bladder scan: significant for >700mL  -   Barth placed  -  Passed TOV on 7/15  -  RESOLVED

## 2025-07-16 NOTE — PROGRESS NOTE ADULT - PROBLEM SELECTOR PLAN 6
Renal US  Bladder US  PSA   eval.
-   History  of CKD, unknown baseline (~1.3 in 2024)  -   BUN/Cr 20/1.67 on admission  -   Creatinine 1.02 today
Renal US  Bladder US  PSA   eval.
-   History  of CKD, unknown baseline (~1.3 in 2024)  -   BUN/Cr 20/1.67 on admission  - Avoid nephrotoxins
-   History  of CKD, unknown baseline (~1.3 in 2024)  -   BUN/Cr 20/1.67 on admission  - Avoid nephrotoxins

## 2025-07-16 NOTE — PROGRESS NOTE ADULT - PROBLEM SELECTOR PLAN 3
-   Patient presented with HR of 113,  /82, 95% RA, **  -   WBC 19.26  -   Lactate 3.6 on admission  -   RVP Negative  -   UA: Negative for UTI, Ketonuria & Glucosuria, Bcx ngtd  -   Chest x-ray Lungs clear, L hemidiaphragm elevation seen previously  -   Lactate 3.6 > s/p 2L 3.3> 1L> 1.3  -   s/p CODE sepsis > CTX 1g  -   s/p bolus 3L  -  D/Cd Ceftriaxone iso neg infectious process  -   Trend lactate to normalization-   Tylenol prn
-   Patient presented with HR of 113,  /82, 95% RA, **  -   WBC 19.26  -   Lactate 3.6 on admission  -   RVP Negative  -   UA: Negative for UTI, Ketonuria & Glucosuria, Bcx ngtd  -   Chest x-ray Lungs clear, L hemidiaphragm elevation seen previously  -   Lactate 3.6 > s/p 2L 3.3> 1L> 1.3  -   s/p CODE sepsis > CTX 1g  -   s/p bolus 3L  -  D/Cd Ceftriaxone iso neg infectious process  -   Trend lactate to normalization-   Tylenol prn
Accuchecks with regular insulin coverage  HA1C  Endo eval.
-   Patient presented with HR of 113,  /82, 95% RA, **  -   WBC 19.26  -   Lactate 3.6 on admission  -   RVP Negative  -   UA: Negative for UTI, Ketonuria & Glucosuria  -   Chest x-ray Lungs clear, L hemidiaphragm elevation seen previously  -   Lactate 3.6 > s/p 2L 3.3> 1L>   -   s/p CODE sepsis > CTX 1g  -   s/p bolus 3L  -   Continue with Ceftriaxone daily  -   Trend lactate to normalization  -   Tylenol prn
Accuchecks with regular insulin coverage  HA1C  Endo eval.

## 2025-07-16 NOTE — PROGRESS NOTE ADULT - PROBLEM SELECTOR PROBLEM 6
Acute kidney injury superimposed on CKD
Urinary retention
Acute kidney injury superimposed on CKD
Urinary retention
Acute kidney injury superimposed on CKD

## 2025-07-16 NOTE — PROGRESS NOTE ADULT - PROBLEM SELECTOR PLAN 7
-  Not currently on any medication
-  Not currently on any medication
Improved  IVF  Monitor LA level.
-  Not currently on any medication
Improved  IVF  Monitor LA level.

## 2025-07-16 NOTE — PROGRESS NOTE ADULT - PROBLEM SELECTOR PLAN 4
Monitor BP  Cont with meds.
Monitor BP  Cont with meds.
-   Received sepsis bolus in ED  -   Repeat lactic acid 1.3
-   Received sepsis bolus in ED  -   Repeat lactic acid 1.3
-   Received sepsis bolus in ED  -   Repeat lactic acid 1.3  -   RESOLVED

## 2025-07-16 NOTE — PROGRESS NOTE ADULT - SUBJECTIVE AND OBJECTIVE BOX
Date of Service 07-16-25 @ 09:29    CHIEF COMPLAINT:Patient is a 80y old  Male who presents with a chief complaint of Fall .Pt appears comfortebale.	  REVIEW OF SYSTEMS:  CONSTITUTIONAL: No fever, weight loss, or fatigue  EYES: No eye pain, visual disturbances, or discharge  ENT:  No difficulty hearing, tinnitus, vertigo; No sinus or throat pain  NECK: No pain or stiffness  RESPIRATORY: No cough, wheezing, chills or hemoptysis; No Shortness of Breath  CARDIOVASCULAR: No chest pain, palpitations, passing out, dizziness, or leg swelling  GASTROINTESTINAL: No abdominal or epigastric pain. No nausea, vomiting, or hematemesis; No diarrhea or constipation. No melena or hematochezia.  GENITOURINARY: No dysuria, frequency, hematuria, or incontinence  NEUROLOGICAL: No headaches, memory loss, loss of strength, numbness, or tremors  SKIN: No itching, burning, rashes, or lesions   LYMPH Nodes: No enlarged glands  ENDOCRINE: No heat or cold intolerance; No hair loss  MUSCULOSKELETAL: No joint pain or swelling; No muscle, back, or extremity pain  PSYCHIATRIC: No depression, anxiety, mood swings, or difficulty sleeping  HEME/LYMPH: No easy bruising, or bleeding gums  ALLERGY AND IMMUNOLOGIC: No hives or eczema	      PHYSICAL EXAM:  T(C): 36.5 (07-16-25 @ 07:49), Max: 36.9 (07-15-25 @ 19:33)  HR: 76 (07-16-25 @ 07:49) (71 - 92)  BP: 138/93 (07-16-25 @ 07:49) (100/77 - 153/79)  RR: 18 (07-16-25 @ 07:49) (18 - 19)  SpO2: 94% (07-16-25 @ 07:49) (94% - 98%)  Wt(kg): --  I&O's Summary    15 Jul 2025 07:01  -  16 Jul 2025 07:00  --------------------------------------------------------  IN: 0 mL / OUT: 200 mL / NET: -200 mL    16 Jul 2025 07:01  -  16 Jul 2025 09:29  --------------------------------------------------------  IN: 100 mL / OUT: 100 mL / NET: 0 mL        Appearance: Normal	  HEENT:   Normal oral mucosa, PERRL, EOMI	  Lymphatic: No lymphadenopathy  Cardiovascular: Normal S1 S2, No JVD, No murmurs, No edema  Respiratory: Lungs clear to auscultation	  Psychiatry: A & O x 3, Mood & affect appropriate  Gastrointestinal:  Soft, Non-tender, + BS	  Skin: No rashes, No ecchymoses, No cyanosis	  Neurologic: Non-focal  Extremities: Normal range of motion, No clubbing, cyanosis or edema  Vascular: Peripheral pulses palpable 2+ bilaterally    MEDICATIONS  (STANDING):  dextrose 5%. 1000 milliLiter(s) (50 mL/Hr) IV Continuous <Continuous>  dextrose 5%. 1000 milliLiter(s) (100 mL/Hr) IV Continuous <Continuous>  dextrose 50% Injectable 25 Gram(s) IV Push once  dextrose 50% Injectable 12.5 Gram(s) IV Push once  dextrose 50% Injectable 25 Gram(s) IV Push once  glucagon  Injectable 1 milliGRAM(s) IntraMuscular once  heparin   Injectable 5000 Unit(s) SubCutaneous every 12 hours  insulin glargine Injectable (LANTUS) 13 Unit(s) SubCutaneous at bedtime  insulin lispro (ADMELOG) corrective regimen sliding scale   SubCutaneous three times a day before meals  insulin lispro Injectable (ADMELOG) 6 Unit(s) SubCutaneous three times a day before meals        	  LABS:	                 proBNP:   Lipid Profile: Cholesterol 134  LDL --  HDL 45    Ldl calc 64  Ratio --    HgA1c:   TSH: Thyroid Stimulating Hormone, Serum: 2.42 uU/mL (07-14 @ 05:13)

## 2025-07-16 NOTE — DISCHARGE NOTE PROVIDER - CARE PROVIDER_API CALL
Jonn Mazariegos ()  Internal Medicine  [address] [place] [id number]  Phone: [phone]  Fax: [phone]  Follow Up Time:     [facility] provider,   Phone: (   )    -  Fax: (   )    -  Follow Up Time:

## 2025-07-16 NOTE — DISCHARGE NOTE PROVIDER - NSDCCPCAREPLAN_GEN_ALL_CORE_FT
PRINCIPAL DISCHARGE DIAGNOSIS  Diagnosis: Syncope  Assessment and Plan of Treatment:       SECONDARY DISCHARGE DIAGNOSES  Diagnosis: Diabetes mellitus  Assessment and Plan of Treatment:     Diagnosis: Hypertension  Assessment and Plan of Treatment:     Diagnosis: Hyperlipidemia  Assessment and Plan of Treatment:     Diagnosis: Urinary retention  Assessment and Plan of Treatment:      PRINCIPAL DISCHARGE DIAGNOSIS  Diagnosis: Near syncope  Assessment and Plan of Treatment: You presented to the hospital with dizziness and fall. You reported hitting your head.   Your Catscan of your head show: Volume loss and chronic microvascular ischemic changes. CT CERVICAL SPINE: No fracture. High-grade bilateral C5-C6 and C6-C7. Foraminal stenosis due to advanced disc and uncovertebral joint degeneration.  You were evaluated by cardiology and Stress test showed no ischemia.      SECONDARY DISCHARGE DIAGNOSES  Diagnosis: Diabetes mellitus  Assessment and Plan of Treatment: Your A1c was 12.2   You were evaluated by endocrionoly whom recommended ++++++    Diagnosis: Hypertension  Assessment and Plan of Treatment: You have a history of high blood pressure. High blood pressure is a condition that puts you at risk for heart attack, stroke and kidney disease. Please continue to take your medications as prescribed. You can also help control your blood pressure by maintaining a healthy weight, eating a diet low in fat and rich in fruits and vegetables, reduce the amount of salt in your diet. Also, reduce alcohol and try to include some form of physical activity daily for at least 30 mins. Follow up with your medical doctor to establish long term blood pressure treatment goals.  Notify your doctor if you have any of the following symptoms:   Dizziness, Lightheadedness, Blurry vision, Headache, Chest pain, Shortness of breath    Diagnosis: Hyperlipidemia  Assessment and Plan of Treatment: You have a history of hyperlipidemia, which is when you have too much cholesterol in your blood. High amounts of cholesterol in your blood can put you at higher risks for heart attck, strokes and other health problems. Follow up with PCP for treatment goals, continue medication as prescribed, have liver function testing every 3 months as anti lipid medications can cause liver irritation, eat low fat meals, avoid red meat, butter, fried foods and cheese. Get daily exercise.    Diagnosis: Urinary retention  Assessment and Plan of Treatment: You presented with urniary retention and had a singh inserted the removed and began voiding on your own.     PRINCIPAL DISCHARGE DIAGNOSIS  Diagnosis: Near syncope  Assessment and Plan of Treatment: You presented to the hospital with dizziness and fall. You reported hitting your head.   Your Catscan of your head show: Volume loss and chronic microvascular ischemic changes. CT CERVICAL SPINE: No fracture. High-grade bilateral C5-C6 and C6-C7. Foraminal stenosis due to advanced disc and uncovertebral joint degeneration.  You were evaluated by cardiology and Stress test showed no ischemia.      SECONDARY DISCHARGE DIAGNOSES  Diagnosis: Diabetes mellitus  Assessment and Plan of Treatment: Your A1c was 12.2   You were evaluated by endocrionoly who recommended Insulin in setting of uncontrolled diabetes.  You refused Insulin despited detailed explanations by endocrinologist.  You are now recommended oral anti diabetics which you should take.  Follow up with outpatient endocrinology.    Diagnosis: Hypertension  Assessment and Plan of Treatment: You have a history of high blood pressure. High blood pressure is a condition that puts you at risk for heart attack, stroke and kidney disease. Please continue to take your medications as prescribed. You can also help control your blood pressure by maintaining a healthy weight, eating a diet low in fat and rich in fruits and vegetables, reduce the amount of salt in your diet. Also, reduce alcohol and try to include some form of physical activity daily for at least 30 mins. Follow up with your medical doctor to establish long term blood pressure treatment goals.  Notify your doctor if you have any of the following symptoms:   Dizziness, Lightheadedness, Blurry vision, Headache, Chest pain, Shortness of breath    Diagnosis: Hyperlipidemia  Assessment and Plan of Treatment: You have a history of hyperlipidemia, which is when you have too much cholesterol in your blood. High amounts of cholesterol in your blood can put you at higher risks for heart attck, strokes and other health problems. Follow up with PCP for treatment goals, continue medication as prescribed, have liver function testing every 3 months as anti lipid medications can cause liver irritation, eat low fat meals, avoid red meat, butter, fried foods and cheese. Get daily exercise.    Diagnosis: Urinary retention  Assessment and Plan of Treatment: You presented with urniary retention and had a singh inserted the removed and began voiding on your own.

## 2025-07-16 NOTE — PROGRESS NOTE ADULT - SUBJECTIVE AND OBJECTIVE BOX
NP Note discussed with  primary attending    Patient is a 80y old  Male who presents with a chief complaint of pre-sycnope w/ head strike (16 Jul 2025 12:57)      INTERVAL HPI/OVERNIGHT EVENTS: no new complaints    MEDICATIONS  (STANDING):  dextrose 5%. 1000 milliLiter(s) (50 mL/Hr) IV Continuous <Continuous>  dextrose 5%. 1000 milliLiter(s) (100 mL/Hr) IV Continuous <Continuous>  dextrose 50% Injectable 25 Gram(s) IV Push once  dextrose 50% Injectable 12.5 Gram(s) IV Push once  dextrose 50% Injectable 25 Gram(s) IV Push once  glucagon  Injectable 1 milliGRAM(s) IntraMuscular once  heparin   Injectable 5000 Unit(s) SubCutaneous every 12 hours  insulin glargine Injectable (LANTUS) 13 Unit(s) SubCutaneous at bedtime  insulin lispro (ADMELOG) corrective regimen sliding scale   SubCutaneous three times a day before meals  insulin lispro Injectable (ADMELOG) 6 Unit(s) SubCutaneous three times a day before meals    MEDICATIONS  (PRN):  acetaminophen     Tablet .. 650 milliGRAM(s) Oral every 6 hours PRN Temp greater or equal to 38C (100.4F), Mild Pain (1 - 3)  aluminum hydroxide/magnesium hydroxide/simethicone Suspension 30 milliLiter(s) Oral every 4 hours PRN Dyspepsia  dextrose Oral Gel 15 Gram(s) Oral once PRN Blood Glucose LESS THAN 70 milliGRAM(s)/deciliter  ondansetron Injectable 4 milliGRAM(s) IV Push every 8 hours PRN Nausea and/or Vomiting      __________________________________________________  REVIEW OF SYSTEMS:    CONSTITUTIONAL: No fever,   EYES: no acute visual disturbances  NECK: No pain or stiffness  RESPIRATORY: No cough; No shortness of breath  CARDIOVASCULAR: No chest pain, no palpitations  GASTROINTESTINAL: No pain. No nausea or vomiting; No diarrhea   NEUROLOGICAL: No headache or numbness, no tremors  MUSCULOSKELETAL: No joint pain, no muscle pain  GENITOURINARY: no dysuria, no frequency, no hesitancy  PSYCHIATRY: no depression , no anxiety  ALL OTHER  ROS negative        Vital Signs Last 24 Hrs  T(C): 36.5 (16 Jul 2025 11:19), Max: 36.9 (15 Jul 2025 19:33)  T(F): 97.7 (16 Jul 2025 11:19), Max: 98.4 (15 Jul 2025 19:33)  HR: 91 (16 Jul 2025 11:19) (71 - 92)  BP: 109/67 (16 Jul 2025 11:19) (100/77 - 153/79)  BP(mean): --  RR: 19 (16 Jul 2025 11:19) (18 - 19)  SpO2: 94% (16 Jul 2025 11:19) (94% - 98%)    Parameters below as of 16 Jul 2025 11:19  Patient On (Oxygen Delivery Method): room air        ________________________________________________  PHYSICAL EXAM:  GENERAL: NAD  HEENT: Normocephalic;  conjunctivae and sclerae clear; moist mucous membranes;   NECK : supple  CHEST/LUNG: Clear to ausculitation bilaterally with good air entry   HEART: S1 S2  regular; no murmurs, gallops or rubs  ABDOMEN: Soft, Nontender, Nondistended; Bowel sounds present  EXTREMITIES: no cyanosis; no edema; no calf tenderness  SKIN: warm and dry; no rash  NERVOUS SYSTEM:  Awake and alert; Oriented  to place, person and time ; no new deficits    _________________________________________________  LABS:              CAPILLARY BLOOD GLUCOSE      POCT Blood Glucose.: 290 mg/dL (16 Jul 2025 11:43)  POCT Blood Glucose.: 241 mg/dL (16 Jul 2025 07:34)  POCT Blood Glucose.: 241 mg/dL (15 Jul 2025 20:59)  POCT Blood Glucose.: 195 mg/dL (15 Jul 2025 16:26)        RADIOLOGY & ADDITIONAL TESTS:  < from: CT Chest No Cont (07.14.25 @ 18:50) >  IMPRESSION:  Severe elevation of the left hemidiaphragm, similar to the prior study    No pneumonia. No pleural effusion.    Nonobstructing bilateral renal calculi and bilateral renal cysts.    < end of copied text >    Imaging Personally Reviewed:  YES    Consultant(s) Notes Reviewed:   YES    Care Discussed with Consultants :     Plan of care was discussed with patient and /or primary care giver; all questions and concerns were addressed and care was aligned with patient's wishes.

## 2025-07-16 NOTE — DISCHARGE NOTE PROVIDER - PROVIDER TOKENS
Yes PROVIDER:[TOKEN:[[id number]]],FREE:[LAST:[e[facility] provider],PHONE:[(   )    -],FAX:[(   )    -]]

## 2025-07-16 NOTE — PROGRESS NOTE ADULT - PROBLEM SELECTOR PLAN 9
Spoke to Deann, she is wondering if Dr. Garces can order an external chest xray, pt has been coughing really bad, and also to give her a medication for cough. They are also asking for something to help her sleep at night, They state that she \"screams\" all night long.  Please advise. [facility] fax, [phone]. Pharmacy t'd up.  
-   History of dementia baseline AAO2-3  -   Supportive care
-   History of dementia baseline AAO2-3  -   Supportive care
Cont with meds  Supportive care.
-   History of dementia baseline AAO2-3  -   Supportive care
Cont with meds  Supportive care.

## 2025-07-16 NOTE — PROGRESS NOTE ADULT - SUBJECTIVE AND OBJECTIVE BOX
Patient is a 80y old  Male who presents with a chief complaint of Fall (16 Jul 2025 09:29)    pt seen in tele [ x ], reg med floor [   ], bed [x  ], chair at bedside [   ], awake and responsive [x  ], lethargic [  ],  nad [ x ]      Allergies    No Known Allergies        Vitals    T(F): 97.7 (07-16-25 @ 07:49), Max: 98.4 (07-15-25 @ 19:33)  HR: 76 (07-16-25 @ 07:49) (71 - 92)  BP: 138/93 (07-16-25 @ 07:49) (100/77 - 153/79)  RR: 18 (07-16-25 @ 07:49) (18 - 19)  SpO2: 94% (07-16-25 @ 07:49) (94% - 98%)  Wt(kg): --  CAPILLARY BLOOD GLUCOSE      POCT Blood Glucose.: 241 mg/dL (16 Jul 2025 07:34)      Labs        Blood Blood-Peripheral  07-12 @ 13:15   No growth at 72 Hours  --  --      Blood Blood-Peripheral  07-12 @ 13:10   No growth at 72 Hours  --  --      < from: TTE W or WO Ultrasound Enhancing Agent (07.13.25 @ 15:19) >  CONCLUSIONS:      1. Uninterpretable Study, No acoustic windows.    < end of copied text >      < from: Nuclear Stress Test-Pharmacologic.. (07.14.25 @ 07:57) >  Conclusions:   1. Normal myocardial perfusion scan, with no evidence of infarction or inducible ischemia.   2. Baseline electrocardiogram: normal sinus rhythm at a rate of 71 bpm with no arrhythmias.   3. Stress electrocardiogram: No significant ischemic ST segment changes.   4. Normal left ventricular regional wall motion.   5. The left ventricle is normal in function and normal in size. The post stress left ventricular EF is 68 %. The stress end diastolic volume is 33 ml and systolic volume is 10 ml.    < end of copied text >        Radiology Results    < from: CT Head No Cont (07.12.25 @ 12:11) >  IMPRESSION:    CT BRAIN: No acute intracranial bleeding.  Volume loss and chronic microvascular ischemic changes.    CT CERVICAL SPINE: No fracture. High-grade bilateral C5-C6 and C6-C7   foraminal stenosis due to advanced disc and uncovertebral joint   degeneration.    < end of copied text >    < from: CT Chest No Cont (07.14.25 @ 18:50) >  IMPRESSION:  Severe elevation of the left hemidiaphragm, similar to the prior study    No pneumonia. No pleural effusion.    Nonobstructing bilateral renal calculi and bilateral renal cysts.    < end of copied text >      < from: US Duplex Venous Lower Ext Complete, Bilateral (07.13.25 @ 13:15) >  IMPRESSION:  No evidence of deep venous thrombosis in either lower extremity.      < end of copied text >          Meds    MEDICATIONS  (STANDING):  dextrose 5%. 1000 milliLiter(s) (50 mL/Hr) IV Continuous <Continuous>  dextrose 5%. 1000 milliLiter(s) (100 mL/Hr) IV Continuous <Continuous>  dextrose 50% Injectable 25 Gram(s) IV Push once  dextrose 50% Injectable 12.5 Gram(s) IV Push once  dextrose 50% Injectable 25 Gram(s) IV Push once  glucagon  Injectable 1 milliGRAM(s) IntraMuscular once  heparin   Injectable 5000 Unit(s) SubCutaneous every 12 hours  insulin glargine Injectable (LANTUS) 13 Unit(s) SubCutaneous at bedtime  insulin lispro (ADMELOG) corrective regimen sliding scale   SubCutaneous three times a day before meals  insulin lispro Injectable (ADMELOG) 6 Unit(s) SubCutaneous three times a day before meals      MEDICATIONS  (PRN):  acetaminophen     Tablet .. 650 milliGRAM(s) Oral every 6 hours PRN Temp greater or equal to 38C (100.4F), Mild Pain (1 - 3)  aluminum hydroxide/magnesium hydroxide/simethicone Suspension 30 milliLiter(s) Oral every 4 hours PRN Dyspepsia  dextrose Oral Gel 15 Gram(s) Oral once PRN Blood Glucose LESS THAN 70 milliGRAM(s)/deciliter  ondansetron Injectable 4 milliGRAM(s) IV Push every 8 hours PRN Nausea and/or Vomiting      Physical Exam    Neuro :  no focal deficits  Respiratory: CTA B/L  CV: RRR, S1S2, no murmurs,   Abdominal: Soft, NT, ND +BS,  Extremities: No edema, + peripheral pulses      ASSESSMENT    syncope   s/p fall   sinus tachycardia poss 2nd to dehydration   Sepsis  esther on ckd   urinary retension,   uncontrolled dm w/ hyperglycemia  h/o Diabetes mellitus  Hypertension  Hyperlipidemia        PLAN    CT BRAIN: No acute intracranial bleeding. Volume loss and chronic microvascular   ischemic changes noted above.  CT CERVICAL SPINE: No fracture. High-grade bilateral C5-C6 and C6-C7   foraminal stenosis due to advanced disc and uncovertebral joint   degeneration noted above.   cardio f/u   echo with Uninterpretable Study, No acoustic windows noted above.      stress test with Normal myocardial perfusion scan, with no evidence of infarction or   inducible ischemia. Stress electrocardiogram: No significant ischemic ST segment changes.  Normal left ventricular regional wall motion. post stress left ventricular EF is 68 %.   stress end diastolic volume is 33 ml and systolic volume is 10 ml noted above.   D/C Tele monitoring.   Benazepril 20mg on hold 2nd to esther   may resume outpt as needed   bp stable off meds   ct chest with Severe elevation of the left hemidiaphragm, similar to the prior study  No pneumonia. No pleural effusion. Nonobstructing bilateral renal calculi and bilateral   renal cysts noted above.  blood cx neg noted above   no signs of infxn   s/p short course of rocephin   serum creat wnl   s/p d/c singh   hgba1c 12.2 noted above   endo f/u   cont lantus 13 units  inc admelog to 6 ac tid  a1c-12.2  fsg ac and hs  pt refusing insulin   consider adding 2 more oral agents ? metformin and farxiga.  le doppler with No evidence of deep venous thrombosis in either lower extremity noted above.   phys tx eval noted and rec phys tx 3-5x/week x 4-12 weeks at [facility]  cont current meds   DC plan for [facility] pending acceptance and auth

## 2025-07-16 NOTE — PROGRESS NOTE ADULT - PROBLEM SELECTOR PROBLEM 5
Hyperlipidemia
Hyperglycemia due to diabetes mellitus
Hyperlipidemia

## 2025-07-16 NOTE — PROGRESS NOTE ADULT - PROBLEM SELECTOR PLAN 10
-   DVT prophylaxis with Heparin subcutaneous
-   DVT prophylaxis with Heparin subcutaneous
DVT PPX.
-   DVT prophylaxis with Heparin subcutaneous
DVT PPX.

## 2025-07-17 ENCOUNTER — TRANSCRIPTION ENCOUNTER (OUTPATIENT)
Age: 80
End: 2025-07-17

## 2025-07-17 VITALS
SYSTOLIC BLOOD PRESSURE: 115 MMHG | TEMPERATURE: 98 F | OXYGEN SATURATION: 95 % | HEART RATE: 98 BPM | RESPIRATION RATE: 18 BRPM | DIASTOLIC BLOOD PRESSURE: 62 MMHG

## 2025-07-17 LAB
CULTURE RESULTS: SIGNIFICANT CHANGE UP
CULTURE RESULTS: SIGNIFICANT CHANGE UP
GLUCOSE BLDC GLUCOMTR-MCNC: 186 MG/DL — HIGH (ref 70–99)
GLUCOSE BLDC GLUCOMTR-MCNC: 215 MG/DL — HIGH (ref 70–99)
SPECIMEN SOURCE: SIGNIFICANT CHANGE UP
SPECIMEN SOURCE: SIGNIFICANT CHANGE UP

## 2025-07-17 PROCEDURE — 80048 BASIC METABOLIC PNL TOTAL CA: CPT

## 2025-07-17 PROCEDURE — 93306 TTE W/DOPPLER COMPLETE: CPT

## 2025-07-17 PROCEDURE — 97110 THERAPEUTIC EXERCISES: CPT

## 2025-07-17 PROCEDURE — 96361 HYDRATE IV INFUSION ADD-ON: CPT

## 2025-07-17 PROCEDURE — 93970 EXTREMITY STUDY: CPT

## 2025-07-17 PROCEDURE — 84484 ASSAY OF TROPONIN QUANT: CPT

## 2025-07-17 PROCEDURE — 84295 ASSAY OF SERUM SODIUM: CPT

## 2025-07-17 PROCEDURE — 82009 KETONE BODYS QUAL: CPT

## 2025-07-17 PROCEDURE — 85027 COMPLETE CBC AUTOMATED: CPT

## 2025-07-17 PROCEDURE — 97530 THERAPEUTIC ACTIVITIES: CPT

## 2025-07-17 PROCEDURE — A9502: CPT

## 2025-07-17 PROCEDURE — 84100 ASSAY OF PHOSPHORUS: CPT

## 2025-07-17 PROCEDURE — 71045 X-RAY EXAM CHEST 1 VIEW: CPT

## 2025-07-17 PROCEDURE — 96374 THER/PROPH/DIAG INJ IV PUSH: CPT

## 2025-07-17 PROCEDURE — 80053 COMPREHEN METABOLIC PANEL: CPT

## 2025-07-17 PROCEDURE — 85025 COMPLETE CBC W/AUTO DIFF WBC: CPT

## 2025-07-17 PROCEDURE — 97116 GAIT TRAINING THERAPY: CPT

## 2025-07-17 PROCEDURE — 82947 ASSAY GLUCOSE BLOOD QUANT: CPT

## 2025-07-17 PROCEDURE — 70450 CT HEAD/BRAIN W/O DYE: CPT

## 2025-07-17 PROCEDURE — 83935 ASSAY OF URINE OSMOLALITY: CPT

## 2025-07-17 PROCEDURE — 71250 CT THORAX DX C-: CPT

## 2025-07-17 PROCEDURE — 78452 HT MUSCLE IMAGE SPECT MULT: CPT

## 2025-07-17 PROCEDURE — 82570 ASSAY OF URINE CREATININE: CPT

## 2025-07-17 PROCEDURE — 83735 ASSAY OF MAGNESIUM: CPT

## 2025-07-17 PROCEDURE — 36415 COLL VENOUS BLD VENIPUNCTURE: CPT

## 2025-07-17 PROCEDURE — 83036 HEMOGLOBIN GLYCOSYLATED A1C: CPT

## 2025-07-17 PROCEDURE — 82330 ASSAY OF CALCIUM: CPT

## 2025-07-17 PROCEDURE — 83605 ASSAY OF LACTIC ACID: CPT

## 2025-07-17 PROCEDURE — 87637 SARSCOV2&INF A&B&RSV AMP PRB: CPT

## 2025-07-17 PROCEDURE — 85730 THROMBOPLASTIN TIME PARTIAL: CPT

## 2025-07-17 PROCEDURE — 87040 BLOOD CULTURE FOR BACTERIA: CPT

## 2025-07-17 PROCEDURE — 82803 BLOOD GASES ANY COMBINATION: CPT

## 2025-07-17 PROCEDURE — 81001 URINALYSIS AUTO W/SCOPE: CPT

## 2025-07-17 PROCEDURE — 85610 PROTHROMBIN TIME: CPT

## 2025-07-17 PROCEDURE — 97162 PT EVAL MOD COMPLEX 30 MIN: CPT

## 2025-07-17 PROCEDURE — 82010 KETONE BODYS QUAN: CPT

## 2025-07-17 PROCEDURE — 84443 ASSAY THYROID STIM HORMONE: CPT

## 2025-07-17 PROCEDURE — 93017 CV STRESS TEST TRACING ONLY: CPT

## 2025-07-17 PROCEDURE — 84133 ASSAY OF URINE POTASSIUM: CPT

## 2025-07-17 PROCEDURE — 72170 X-RAY EXAM OF PELVIS: CPT

## 2025-07-17 PROCEDURE — 82746 ASSAY OF FOLIC ACID SERUM: CPT

## 2025-07-17 PROCEDURE — 84540 ASSAY OF URINE/UREA-N: CPT

## 2025-07-17 PROCEDURE — 84132 ASSAY OF SERUM POTASSIUM: CPT

## 2025-07-17 PROCEDURE — 84300 ASSAY OF URINE SODIUM: CPT

## 2025-07-17 PROCEDURE — 80061 LIPID PANEL: CPT

## 2025-07-17 PROCEDURE — 82962 GLUCOSE BLOOD TEST: CPT

## 2025-07-17 PROCEDURE — 72125 CT NECK SPINE W/O DYE: CPT

## 2025-07-17 PROCEDURE — 84156 ASSAY OF PROTEIN URINE: CPT

## 2025-07-17 PROCEDURE — 99285 EMERGENCY DEPT VISIT HI MDM: CPT

## 2025-07-17 RX ORDER — ACETAMINOPHEN 500 MG/5ML
2 LIQUID (ML) ORAL
Qty: 0 | Refills: 0 | DISCHARGE
Start: 2025-07-17

## 2025-07-17 RX ORDER — EMPAGLIFLOZIN AND METFORMIN HYDROCHLORIDE 12.5; 1 MG/1; MG/1
1 TABLET ORAL
Qty: 30 | Refills: 0
Start: 2025-07-17 | End: 2025-08-15

## 2025-07-17 RX ORDER — MAGNESIUM, ALUMINUM HYDROXIDE 200-200 MG
30 TABLET,CHEWABLE ORAL
Qty: 0 | Refills: 0 | DISCHARGE
Start: 2025-07-17

## 2025-07-17 RX ORDER — NATEGLINIDE 60 MG/1
1 TABLET ORAL
Qty: 90 | Refills: 0
Start: 2025-07-17 | End: 2025-08-15

## 2025-07-17 RX ADMIN — INSULIN LISPRO 4: 100 INJECTION, SOLUTION INTRAVENOUS; SUBCUTANEOUS at 11:19

## 2025-07-17 RX ADMIN — INSULIN LISPRO 6 UNIT(S): 100 INJECTION, SOLUTION INTRAVENOUS; SUBCUTANEOUS at 07:42

## 2025-07-17 RX ADMIN — HEPARIN SODIUM 5000 UNIT(S): 1000 INJECTION INTRAVENOUS; SUBCUTANEOUS at 05:39

## 2025-07-17 RX ADMIN — INSULIN LISPRO 2: 100 INJECTION, SOLUTION INTRAVENOUS; SUBCUTANEOUS at 07:41

## 2025-07-17 RX ADMIN — INSULIN LISPRO 6 UNIT(S): 100 INJECTION, SOLUTION INTRAVENOUS; SUBCUTANEOUS at 11:18

## 2025-07-17 NOTE — PROGRESS NOTE ADULT - SUBJECTIVE AND OBJECTIVE BOX
Date of Service 07-17-25 @ 12:22    CHIEF COMPLAINT:Patient is a 80y old  Male who presents with a chief complaint of s/p syncope, esther on ckd, uncontrolled dm w/ hyperglycemia, sepsis.Pt appears comfortable.    	  REVIEW OF SYSTEMS:  CONSTITUTIONAL: No fever, weight loss, or fatigue  EYES: No eye pain, visual disturbances, or discharge  ENT:  No difficulty hearing, tinnitus, vertigo; No sinus or throat pain  NECK: No pain or stiffness  RESPIRATORY: No cough, wheezing, chills or hemoptysis; No Shortness of Breath  CARDIOVASCULAR: No chest pain, palpitations, passing out, dizziness, or leg swelling  GASTROINTESTINAL: No abdominal or epigastric pain. No nausea, vomiting, or hematemesis; No diarrhea or constipation. No melena or hematochezia.  GENITOURINARY: No dysuria, frequency, hematuria, or incontinence  NEUROLOGICAL: No headaches, memory loss, loss of strength, numbness, or tremors  SKIN: No itching, burning, rashes, or lesions   LYMPH Nodes: No enlarged glands  ENDOCRINE: No heat or cold intolerance; No hair loss  MUSCULOSKELETAL: No joint pain or swelling; No muscle, back, or extremity pain  PSYCHIATRIC: No depression, anxiety, mood swings, or difficulty sleeping  HEME/LYMPH: No easy bruising, or bleeding gums  ALLERGY AND IMMUNOLOGIC: No hives or eczema	        PHYSICAL EXAM:  T(C): 36.4 (07-17-25 @ 05:51), Max: 36.7 (07-16-25 @ 20:14)  HR: 95 (07-17-25 @ 10:57) (82 - 95)  BP: 117/78 (07-17-25 @ 10:57) (117/78 - 156/89)  RR: 18 (07-17-25 @ 05:51) (18 - 19)  SpO2: 93% (07-17-25 @ 10:57) (91% - 96%)  Wt(kg): --  I&O's Summary    16 Jul 2025 07:01  -  17 Jul 2025 07:00  --------------------------------------------------------  IN: 100 mL / OUT: 100 mL / NET: 0 mL        Appearance: Normal	  HEENT:   Normal oral mucosa, PERRL, EOMI	  Lymphatic: No lymphadenopathy  Cardiovascular: Normal S1 S2, No JVD, No murmurs, No edema  Respiratory: Lungs clear to auscultation	  Psychiatry: A & O x 3, Mood & affect appropriate  Gastrointestinal:  Soft, Non-tender, + BS	  Skin: No rashes, No ecchymoses, No cyanosis	  Neurologic: Non-focal  Extremities: Normal range of motion, No clubbing, cyanosis or edema  Vascular: Peripheral pulses palpable 2+ bilaterally    MEDICATIONS  (STANDING):  dextrose 5%. 1000 milliLiter(s) (50 mL/Hr) IV Continuous <Continuous>  dextrose 5%. 1000 milliLiter(s) (100 mL/Hr) IV Continuous <Continuous>  dextrose 50% Injectable 25 Gram(s) IV Push once  dextrose 50% Injectable 12.5 Gram(s) IV Push once  dextrose 50% Injectable 25 Gram(s) IV Push once  glucagon  Injectable 1 milliGRAM(s) IntraMuscular once  heparin   Injectable 5000 Unit(s) SubCutaneous every 12 hours  insulin glargine Injectable (LANTUS) 13 Unit(s) SubCutaneous at bedtime  insulin lispro (ADMELOG) corrective regimen sliding scale   SubCutaneous three times a day before meals  insulin lispro Injectable (ADMELOG) 6 Unit(s) SubCutaneous three times a day before meals        LABS:	 	      Lipid Profile: Cholesterol 134  LDL --  HDL 45    Ldl calc 64  Ratio --    HgA1c:   TSH: Thyroid Stimulating Hormone, Serum: 2.42 uU/mL (07-14 @ 05:13)

## 2025-07-17 NOTE — PROGRESS NOTE ADULT - SUBJECTIVE AND OBJECTIVE BOX
Patient is a 80y old  Male who presents with a chief complaint of fall (16 Jul 2025 12:57)    pt seen in tele [ x ], reg med floor [   ], bed [x  ], chair at bedside [   ], awake and responsive [x  ], lethargic [  ],  nad [ x ]    Allergies    No Known Allergies        Vitals    T(F): 97.6 (07-17-25 @ 05:51), Max: 98 (07-16-25 @ 20:14)  HR: 82 (07-17-25 @ 05:51) (82 - 94)  BP: 132/86 (07-17-25 @ 05:51) (109/67 - 156/89)  RR: 18 (07-17-25 @ 05:51) (18 - 19)  SpO2: 95% (07-17-25 @ 05:51) (94% - 96%)  Wt(kg): --  CAPILLARY BLOOD GLUCOSE      POCT Blood Glucose.: 186 mg/dL (17 Jul 2025 07:30)      Labs                      Blood Blood-Peripheral  07-12 @ 13:15   No growth at 4 days  --  --      Blood Blood-Peripheral  07-12 @ 13:10   No growth at 4 days  --  --          Radiology Results      Meds    MEDICATIONS  (STANDING):  dextrose 5%. 1000 milliLiter(s) (50 mL/Hr) IV Continuous <Continuous>  dextrose 5%. 1000 milliLiter(s) (100 mL/Hr) IV Continuous <Continuous>  dextrose 50% Injectable 25 Gram(s) IV Push once  dextrose 50% Injectable 12.5 Gram(s) IV Push once  dextrose 50% Injectable 25 Gram(s) IV Push once  glucagon  Injectable 1 milliGRAM(s) IntraMuscular once  heparin   Injectable 5000 Unit(s) SubCutaneous every 12 hours  insulin glargine Injectable (LANTUS) 13 Unit(s) SubCutaneous at bedtime  insulin lispro (ADMELOG) corrective regimen sliding scale   SubCutaneous three times a day before meals  insulin lispro Injectable (ADMELOG) 6 Unit(s) SubCutaneous three times a day before meals      MEDICATIONS  (PRN):  acetaminophen     Tablet .. 650 milliGRAM(s) Oral every 6 hours PRN Temp greater or equal to 38C (100.4F), Mild Pain (1 - 3)  aluminum hydroxide/magnesium hydroxide/simethicone Suspension 30 milliLiter(s) Oral every 4 hours PRN Dyspepsia  dextrose Oral Gel 15 Gram(s) Oral once PRN Blood Glucose LESS THAN 70 milliGRAM(s)/deciliter  ondansetron Injectable 4 milliGRAM(s) IV Push every 8 hours PRN Nausea and/or Vomiting      Physical Exam    Neuro :  no focal deficits  Respiratory: CTA B/L  CV: RRR, S1S2, no murmurs,   Abdominal: Soft, NT, ND +BS,  Extremities: No edema, + peripheral pulses      ASSESSMENT    syncope   s/p fall   sinus tachycardia poss 2nd to dehydration   Sepsis  esther on ckd   urinary retension,   uncontrolled dm w/ hyperglycemia  h/o Diabetes mellitus  Hypertension  Hyperlipidemia        PLAN    CT BRAIN: No acute intracranial bleeding. Volume loss and chronic microvascular   ischemic changes noted   CT CERVICAL SPINE: No fracture. High-grade bilateral C5-C6 and C6-C7   foraminal stenosis due to advanced disc and uncovertebral joint   degeneration noted   cardio f/u   echo with Uninterpretable Study, No acoustic windows noted     stress test with Normal myocardial perfusion scan, with no evidence of infarction or   inducible ischemia. Stress electrocardiogram: No significant ischemic ST segment changes.  Normal left ventricular regional wall motion. post stress left ventricular EF is 68 %.   stress end diastolic volume is 33 ml and systolic volume is 10 ml noted     D/C Tele monitoring.   Benazepril 20mg on hold 2nd to esther   may resume outpt as needed   bp stable off meds   ct chest with Severe elevation of the left hemidiaphragm, similar to the prior study  No pneumonia. No pleural effusion. Nonobstructing bilateral renal calculi and bilateral   renal cysts noted   blood cx neg noted above   no signs of infxn   s/p short course of rocephin   serum creat wnl   s/p d/c singh   hgba1c 12.2 noted    endo f/u   cont lantus 13 units  cont admelog to 6 ac tid  a1c-12.2  fsg ac and hs  pt refusing insulin - D/W PT AND WIFE AT LENGTH   rec synjardy 10/100 qam, januvia qd and starlix 60mg ac tid upon d/c   le doppler with No evidence of deep venous thrombosis in either lower extremity noted    phys tx eval noted and rec phys tx 3-5x/week x 4-12 weeks at [facility]  cont current meds   DC plan for [facility] pending auth

## 2025-07-17 NOTE — DISCHARGE NOTE NURSING/CASE MANAGEMENT/SOCIAL WORK - PATIENT PORTAL LINK FT
You can access the FollowMyHealth Patient Portal offered by [facility] by registering at the following website: [email or web address]. By joining [facility]’s FollowMyHealth portal, you will also be able to view your health information using other applications (apps) compatible with our system. 03-Feb-2025

## 2025-07-17 NOTE — PROGRESS NOTE ADULT - ASSESSMENT
80M Dementia (baseline AAO 2-3), HLD, & HTN BIBEMS for fall AM. Pt poor historian. States that he was in the shower in the morning and felt dizzy then was on floor and hit his head,YANG,sepsis,sinus tach and syncope.  1.PT.  2.YANG-resolved.  3.DM-Insulin.  4.Pt passed TOV.  5.Stress test-no ischemia.  6.GI and DVT prophylaxis.
80M Dementia (baseline AAO 2-3), HLD, & HTN BIBEMS for fall AM. Pt poor historian. States that he was in the shower in the morning and felt dizzy then was on floor and hit his head,YANG,sepsis,sinus tach and syncope.  1.Tele monitoring.  2.YANG-resolved.  3.DM-Insulin.  4.Dopplers-no dvt.  5.Echocardiogram and stress test.  6.Abnl CXR-CT chest w/o contrast.  7.GI and DVT prophylaxis.
80M Dementia (baseline AAO 2-3), HLD, & HTN BIBEMS for fall AM. Pt poor historian. States that he was in the shower in the morning and felt dizzy then was on floor and hit his head,YANG,sepsis,sinus tach and syncope.  1.PT.  2.YANG-resolved.  3.DM-Insulin.  4.Pt passed TOV.  5.Stress test-no ischemia.  6.GI and DVT prophylaxis.
80M Dementia (baseline AAO 2-3), HLD, & HTN BIBEMS for fall AM. Pt poor historian. States that he was in the shower in the morning and felt dizzy then was on floor and hit his head,YANG,sepsis,sinus tach and syncope.  1.D/C Tele monitoring.  2.YANG-resolved.  3.DM-Insulin.  4.TOV.  5.Stress test-no ischemia.  6.Abnl CXR-CT chest w/o contrast-as above.  7.GI and DVT prophylaxis.
80M Dementia (baseline AAO 2-3), HLD, & HTN BIBEMS for fall AM. Pt poor historian. States that he was in the shower in the morning and felt dizzy then fell and hit his head. Denies loss of consciousness, any medical history, neck pain, changes in vision, headache. As per pt, he takes only 1 pill for DM. Also c/o abdominal pain that is new as per pt. Collateral obtained by wife who went to his side & witnessed the fall. Confirmed no LOC. Wife called ambulance.

## 2025-07-17 NOTE — DISCHARGE NOTE NURSING/CASE MANAGEMENT/SOCIAL WORK - FINANCIAL ASSISTANCE
[facility] provides services at a reduced cost to those who are determined to be eligible through [facility]’s financial assistance program. Information regarding [facility]’s financial assistance program can be found by going to [email or web address] or by calling [phone].

## 2025-07-17 NOTE — PROGRESS NOTE ADULT - SUBJECTIVE AND OBJECTIVE BOX
Interval Events:      Allergies    No Known Allergies    Intolerances      Endocrine/Metabolic Medications:  dextrose 50% Injectable 25 Gram(s) IV Push once  dextrose 50% Injectable 12.5 Gram(s) IV Push once  dextrose 50% Injectable 25 Gram(s) IV Push once  dextrose Oral Gel 15 Gram(s) Oral once PRN  glucagon  Injectable 1 milliGRAM(s) IntraMuscular once  insulin glargine Injectable (LANTUS) 13 Unit(s) SubCutaneous at bedtime  insulin lispro (ADMELOG) corrective regimen sliding scale   SubCutaneous three times a day before meals  insulin lispro Injectable (ADMELOG) 6 Unit(s) SubCutaneous three times a day before meals      Vital Signs Last 24 Hrs  T(C): 36.4 (17 Jul 2025 05:51), Max: 36.7 (16 Jul 2025 20:14)  T(F): 97.6 (17 Jul 2025 05:51), Max: 98 (16 Jul 2025 20:14)  HR: 82 (17 Jul 2025 05:51) (82 - 94)  BP: 132/86 (17 Jul 2025 05:51) (109/67 - 156/89)  BP(mean): 97 (17 Jul 2025 05:51) (97 - 97)  RR: 18 (17 Jul 2025 05:51) (18 - 19)  SpO2: 95% (17 Jul 2025 05:51) (94% - 96%)    Parameters below as of 17 Jul 2025 05:51  Patient On (Oxygen Delivery Method): room air          PHYSICAL EXAM  All physical exam findings normal, except those marked:  General:	Alert, active, cooperative, NAD, well hydrated  .		[] Abnormal:  Neck		Normal: supple, no cervical adenopathy, no palpable thyroid  .		[] Abnormal:  Cardiovascular	Normal: regular rate, normal S1, S2, no murmurs  .		[] Abnormal:  Respiratory	Normal: no chest wall deformity, normal respiratory pattern, CTA B/L  .		[] Abnormal:  Abdominal	Normal: soft, ND, NT, bowel sounds present, no masses, no organomegaly  .		[] Abnormal:  		Normal normal genitalia, testes descended, circumcised/uncircumcised  .		Rashaad stage:			Breast rashaad:  .		Menstrual history:  .		[] Abnormal:  Extremities	Normal: FROM x4  .		[] Abnormal:  Skin		Normal: intact and not indurated, no rash, no acanthosis nigricans  .		[] Abnormal:  Neurologic	Normal: grossly intact  .		[] Abnormal:    LABS        CAPILLARY BLOOD GLUCOSE      POCT Blood Glucose.: 186 mg/dL (17 Jul 2025 07:30)  POCT Blood Glucose.: 266 mg/dL (16 Jul 2025 21:47)  POCT Blood Glucose.: 263 mg/dL (16 Jul 2025 16:27)  POCT Blood Glucose.: 290 mg/dL (16 Jul 2025 11:43)        Assesment/plan       Interval Events:  pt in nad    Allergies    No Known Allergies    Intolerances      Endocrine/Metabolic Medications:  dextrose 50% Injectable 25 Gram(s) IV Push once  dextrose 50% Injectable 12.5 Gram(s) IV Push once  dextrose 50% Injectable 25 Gram(s) IV Push once  dextrose Oral Gel 15 Gram(s) Oral once PRN  glucagon  Injectable 1 milliGRAM(s) IntraMuscular once  insulin glargine Injectable (LANTUS) 13 Unit(s) SubCutaneous at bedtime  insulin lispro (ADMELOG) corrective regimen sliding scale   SubCutaneous three times a day before meals  insulin lispro Injectable (ADMELOG) 6 Unit(s) SubCutaneous three times a day before meals      Vital Signs Last 24 Hrs  T(C): 36.4 (17 Jul 2025 05:51), Max: 36.7 (16 Jul 2025 20:14)  T(F): 97.6 (17 Jul 2025 05:51), Max: 98 (16 Jul 2025 20:14)  HR: 82 (17 Jul 2025 05:51) (82 - 94)  BP: 132/86 (17 Jul 2025 05:51) (109/67 - 156/89)  BP(mean): 97 (17 Jul 2025 05:51) (97 - 97)  RR: 18 (17 Jul 2025 05:51) (18 - 19)  SpO2: 95% (17 Jul 2025 05:51) (94% - 96%)    Parameters below as of 17 Jul 2025 05:51  Patient On (Oxygen Delivery Method): room air          PHYSICAL EXAM  All physical exam findings normal, except those marked:  General:	Alert, active, cooperative, NAD, well hydrated  .		[] Abnormal:  Neck		Normal: supple, no cervical adenopathy, no palpable thyroid  .		[] Abnormal:  Cardiovascular	Normal: regular rate, normal S1, S2, no murmurs  .		[] Abnormal:  Respiratory	Normal: no chest wall deformity, normal respiratory pattern, CTA B/L  .		[] Abnormal:  Abdominal	Normal: soft, ND, NT, bowel sounds present, no masses, no organomegaly  .		[] Abnormal:  		Normal normal genitalia, testes descended, circumcised/uncircumcised  .		Rashaad stage:			Breast rashaad:  .		Menstrual history:  .		[] Abnormal:  Extremities	Normal: FROM x4  .		[] Abnormal:  Skin		Normal: intact and not indurated, no rash, no acanthosis nigricans  .		[] Abnormal:  Neurologic	Normal: grossly intact  .		[] Abnormal:    LABS        CAPILLARY BLOOD GLUCOSE      POCT Blood Glucose.: 186 mg/dL (17 Jul 2025 07:30)  POCT Blood Glucose.: 266 mg/dL (16 Jul 2025 21:47)  POCT Blood Glucose.: 263 mg/dL (16 Jul 2025 16:27)  POCT Blood Glucose.: 290 mg/dL (16 Jul 2025 11:43)        Assesment/plan    80M Dementia (baseline AAO 2-3), HLD, & HTN BIBEMS for fall AM.   Endocrine consulted for dm management. Pt takes januvia as out pt- lives with his wife does not know fsg          Problem/Recommendation - 1:  ·  Problem: Hyperglycemia due to diabetes mellitus.   ·  Recommendation: uncontrolled with hyperglycemia  cont lantus 13 units  admelog to 6 ac tid  a1c-12.2  fsg ac and hs  pt refusing insulin - D/W PT AND WIFE AT LENGTH  d/c all insulin upon d/c   rec synjardy 10/100 qam, januvia qd and starlix 60mg ac tid upon d/c   d/w prim team     Problem/Recommendation - 2:  ·  Problem: Near syncope.   ·  Recommendation: tx per prim  cardio recs.

## 2025-07-17 NOTE — PROGRESS NOTE ADULT - REASON FOR ADMISSION
s/p syncope, esther on ckd, uncontrolled dm w/ hyperglycemia, sepsis,
Fall
Fall
Syncope
s/p syncope, esther on ckd, uncontrolled dm w/ hyperglycemia, sepsis,
syncope, esther on ckd, uncontrolled dm w/ hyperglycemia, sepsis
S/P Fall

## 2025-07-17 NOTE — PROGRESS NOTE ADULT - PROVIDER SPECIALTY LIST ADULT
Cardiology
Cardiology
Endocrinology
Endocrinology
Internal Medicine
Internal Medicine
Cardiology
Cardiology
Internal Medicine

## 2025-07-24 ENCOUNTER — TRANSCRIPTION ENCOUNTER (OUTPATIENT)
Age: 80
End: 2025-07-24

## 2025-08-07 ENCOUNTER — TRANSCRIPTION ENCOUNTER (OUTPATIENT)
Age: 80
End: 2025-08-07

## 2025-08-15 ENCOUNTER — TRANSCRIPTION ENCOUNTER (OUTPATIENT)
Age: 80
End: 2025-08-15